# Patient Record
Sex: FEMALE | Race: WHITE | Employment: FULL TIME | URBAN - METROPOLITAN AREA
[De-identification: names, ages, dates, MRNs, and addresses within clinical notes are randomized per-mention and may not be internally consistent; named-entity substitution may affect disease eponyms.]

---

## 2021-03-15 ENCOUNTER — IMMUNIZATION (OUTPATIENT)
Dept: PRIMARY CARE CLINIC | Age: 61
End: 2021-03-15
Payer: COMMERCIAL

## 2021-03-15 PROCEDURE — 91303 COVID-19, J&J VACCINE, PF, 0.5 ML DOSE: CPT | Performed by: INTERNAL MEDICINE

## 2021-03-15 PROCEDURE — 0031A COVID-19, J&J VACCINE, PF, 0.5 ML DOSE: CPT | Performed by: INTERNAL MEDICINE

## 2022-07-21 ENCOUNTER — HOSPITAL ENCOUNTER (OUTPATIENT)
Dept: PREADMISSION TESTING | Age: 62
Discharge: HOME | End: 2022-07-25
Payer: COMMERCIAL

## 2022-07-21 VITALS
BODY MASS INDEX: 25.44 KG/M2 | WEIGHT: 149.03 LBS | HEIGHT: 64 IN | DIASTOLIC BLOOD PRESSURE: 69 MMHG | RESPIRATION RATE: 14 BRPM | OXYGEN SATURATION: 99 % | SYSTOLIC BLOOD PRESSURE: 146 MMHG | TEMPERATURE: 97.7 F | HEART RATE: 75 BPM

## 2022-07-21 LAB
ABO/RH: NORMAL
ANION GAP SERPL CALCULATED.3IONS-SCNC: 8 MMOL/L (ref 9–17)
ANTIBODY SCREEN: NEGATIVE
ARM BAND NUMBER: NORMAL
BUN BLDV-MCNC: 16 MG/DL (ref 8–23)
CHLORIDE BLD-SCNC: 101 MMOL/L (ref 98–107)
CO2: 29 MMOL/L (ref 20–31)
CREAT SERPL-MCNC: 0.7 MG/DL (ref 0.5–0.9)
EXPIRATION DATE: NORMAL
GFR AFRICAN AMERICAN: >60 ML/MIN
GFR NON-AFRICAN AMERICAN: >60 ML/MIN
GFR SERPL CREATININE-BSD FRML MDRD: NORMAL ML/MIN/{1.73_M2}
HCT VFR BLD CALC: 38.1 % (ref 36.3–47.1)
HEMOGLOBIN: 11.8 G/DL (ref 11.9–15.1)
MCH RBC QN AUTO: 27.5 PG (ref 25.2–33.5)
MCHC RBC AUTO-ENTMCNC: 31 G/DL (ref 28.4–34.8)
MCV RBC AUTO: 88.8 FL (ref 82.6–102.9)
NRBC AUTOMATED: 0 PER 100 WBC
PDW BLD-RTO: 13.6 % (ref 11.8–14.4)
PLATELET # BLD: 288 K/UL (ref 138–453)
PMV BLD AUTO: 8.9 FL (ref 8.1–13.5)
POTASSIUM SERPL-SCNC: 4.3 MMOL/L (ref 3.7–5.3)
RBC # BLD: 4.29 M/UL (ref 3.95–5.11)
SODIUM BLD-SCNC: 138 MMOL/L (ref 135–144)
WBC # BLD: 5.1 K/UL (ref 3.5–11.3)

## 2022-07-21 PROCEDURE — 93005 ELECTROCARDIOGRAM TRACING: CPT | Performed by: ANESTHESIOLOGY

## 2022-07-21 PROCEDURE — 85027 COMPLETE CBC AUTOMATED: CPT

## 2022-07-21 PROCEDURE — 82565 ASSAY OF CREATININE: CPT

## 2022-07-21 PROCEDURE — 86850 RBC ANTIBODY SCREEN: CPT

## 2022-07-21 PROCEDURE — 84520 ASSAY OF UREA NITROGEN: CPT

## 2022-07-21 PROCEDURE — 36415 COLL VENOUS BLD VENIPUNCTURE: CPT

## 2022-07-21 PROCEDURE — 87641 MR-STAPH DNA AMP PROBE: CPT

## 2022-07-21 PROCEDURE — 86900 BLOOD TYPING SEROLOGIC ABO: CPT

## 2022-07-21 PROCEDURE — 86901 BLOOD TYPING SEROLOGIC RH(D): CPT

## 2022-07-21 PROCEDURE — 80051 ELECTROLYTE PANEL: CPT

## 2022-07-21 RX ORDER — CETIRIZINE HYDROCHLORIDE 10 MG/1
10 TABLET ORAL DAILY
COMMUNITY

## 2022-07-21 RX ORDER — GABAPENTIN 300 MG/1
300 CAPSULE ORAL ONCE
Status: CANCELLED | OUTPATIENT
Start: 2022-08-04

## 2022-07-21 RX ORDER — MELOXICAM 15 MG/1
15 TABLET ORAL DAILY
COMMUNITY
Start: 2022-06-30

## 2022-07-21 RX ORDER — VALACYCLOVIR HYDROCHLORIDE 1 G/1
1000 TABLET, FILM COATED ORAL PRN
COMMUNITY
Start: 2021-07-27

## 2022-07-21 RX ORDER — ACETAMINOPHEN 500 MG
1000 TABLET ORAL ONCE
Status: CANCELLED | OUTPATIENT
Start: 2022-08-04

## 2022-07-21 ASSESSMENT — HOOS JR
WALKING ON UNEVEN SURFACE: 2
LYING IN BED (TURNING OVER, MAINTAINING HIP POSITION): 2
HOOS JR RAW SCORE: 9
SITTING: 1
HOOS JR TOTAL INTERVAL SCORE: 61.815
BENDING TO THE FLOOR TO PICK UP OBJECT: 1
GOING UP OR DOWN STAIRS: 2
HOOS JR RAW SCORE: 9
RISING FROM SITTING: 1

## 2022-07-21 ASSESSMENT — PROMIS GLOBAL HEALTH SCALE
IN GENERAL, HOW WOULD YOU RATE YOUR PHYSICAL HEALTH [ON A SCALE OF 1 (POOR) TO 5 (EXCELLENT)]?: 3
IN THE PAST 7 DAYS, HOW OFTEN HAVE YOU BEEN BOTHERED BY EMOTIONAL PROBLEMS, SUCH AS FEELING ANXIOUS, DEPRESSED, OR IRRITABLE [ON A SCALE FROM 1 (NEVER) TO 5 (ALWAYS)]?: 3
IN THE PAST 7 DAYS, HOW WOULD YOU RATE YOUR FATIGUE ON AVERAGE [ON A SCALE FROM 1 (NONE) TO 5 (VERY SEVERE)]?: 4
IN GENERAL, HOW WOULD YOU RATE YOUR MENTAL HEALTH, INCLUDING YOUR MOOD AND YOUR ABILITY TO THINK [ON A SCALE OF 1 (POOR) TO 5 (EXCELLENT)]?: 5
TO WHAT EXTENT ARE YOU ABLE TO CARRY OUT YOUR EVERYDAY PHYSICAL ACTIVITIES SUCH AS WALKING, CLIMBING STAIRS, CARRYING GROCERIES, OR MOVING A CHAIR [ON A SCALE OF 1 (NOT AT ALL) TO 5 (COMPLETELY)]?: 3
IN GENERAL, WOULD YOU SAY YOUR HEALTH IS...[ON A SCALE OF 1 (POOR) TO 5 (EXCELLENT)]: 4
IN GENERAL, WOULD YOU SAY YOUR QUALITY OF LIFE IS...[ON A SCALE OF 1 (POOR) TO 5 (EXCELLENT)]: 3
IN THE PAST 7 DAYS, HOW WOULD YOU RATE YOUR PAIN ON AVERAGE [ON A SCALE FROM 0 (NO PAIN) TO 10 (WORST IMAGINABLE PAIN)]?: 5
SUM OF RESPONSES TO QUESTIONS 3, 6, 7, & 8: 15
IN GENERAL, PLEASE RATE HOW WELL YOU CARRY OUT YOUR USUAL SOCIAL ACTIVITIES (INCLUDES ACTIVITIES AT HOME, AT WORK, AND IN YOUR COMMUNITY, AND RESPONSIBILITIES AS A PARENT, CHILD, SPOUSE, EMPLOYEE, FRIEND, ETC) [ON A SCALE OF 1 (POOR) TO 5 (EXCELLENT)]?: 3
SUM OF RESPONSES TO QUESTIONS 2, 4, 5, & 10: 14
IN GENERAL, HOW WOULD YOU RATE YOUR SATISFACTION WITH YOUR SOCIAL ACTIVITIES AND RELATIONSHIPS [ON A SCALE OF 1 (POOR) TO 5 (EXCELLENT)]?: 3

## 2022-07-21 ASSESSMENT — PAIN DESCRIPTION - ORIENTATION: ORIENTATION: LEFT

## 2022-07-21 ASSESSMENT — PAIN DESCRIPTION - DESCRIPTORS: DESCRIPTORS: ACHING

## 2022-07-21 ASSESSMENT — PAIN DESCRIPTION - FREQUENCY: FREQUENCY: INTERMITTENT

## 2022-07-21 ASSESSMENT — PAIN - FUNCTIONAL ASSESSMENT: PAIN_FUNCTIONAL_ASSESSMENT: PREVENTS OR INTERFERES SOME ACTIVE ACTIVITIES AND ADLS

## 2022-07-21 ASSESSMENT — PAIN DESCRIPTION - LOCATION: LOCATION: HIP

## 2022-07-21 ASSESSMENT — PAIN SCALES - GENERAL: PAINLEVEL_OUTOF10: 5

## 2022-07-21 ASSESSMENT — PAIN DESCRIPTION - PAIN TYPE: TYPE: CHRONIC PAIN

## 2022-07-21 ASSESSMENT — PAIN DESCRIPTION - ONSET: ONSET: ON-GOING

## 2022-07-21 NOTE — H&P
History and Physical Service   [facility]    HISTORY AND PHYSICAL EXAMINATION            Date of Evaluation: 7/21/2022  Patient name:  Oscar Ndiaye  MRN:   [id number]  YOB: 1960  PCP:    Ina Sarabia MD    History Obtained From:     Patient, medical records    History of Present Illness: This is Oscar Ndiaye a 63 y.o. female who presents for a pre-admission testing appointment for an upcoming [facility] -  BIOMET by Nba Heck MD scheduled on 8/4/2022 at [phone] due to Osteoarthritis of left hip, unspecified osteoarthritis type [M16.12]. The patient's chief complaint is  left hip pain which has progressively worsened over the past 1 year. Patient still exercises but has increased pain at times with exercise. Left hip pain is aggravated by laying down, walking long distances, getting in and out of the car and is minimally relieved with Tylenol. Prior treatment includes cortisone injections, physical therapy. Denies recent falls and injuries. Sleep apnea questionnaire  1) Do you snore loudly? No  2) Do you often feel tired, fatigued, or sleepy? No  3) Has anyone observed you stop breathing or choking/gasping during your sleep? No  4) Do you have hypertension? No  5) BMI >35 kg/m2? No  6) Age > 50? Yes  7) Pt is a male? No    Functional Capacity:  1) Pt is able to walk 2 city blocks on level ground without SOB. 2) Pt is able to climb 2 flights of stairs without SOB. 3) Pt is able to walk up a hill for 1-2 city blocks without SOB. Past Medical History:     Past Medical History:   Diagnosis Date    Arthritis     osteoarthritis        Past Surgical History:     Past Surgical History:   Procedure Laterality Date    BREAST SURGERY Right     papilloma. benign    COLONOSCOPY      ENDOMETRIAL ABLATION          Medications Prior to Admission:     Prior to Admission medications    Medication Sig Start Date End Date Taking? Authorizing Provider   valACYclovir (VALTREX) 1 g tablet Take 1,000 mg by mouth as needed 7/27/21  Yes Historical Provider, MD   cetirizine (ZYRTEC) 10 MG tablet Take 10 mg by mouth in the morning. Yes Historical Provider, MD   Cholecalciferol (VITAMIN D3 PO) Take 1 tablet by mouth daily   Yes Historical Provider, MD   meloxicam (MOBIC) 15 MG tablet Take 15 mg by mouth in the morning. 6/30/22   Historical Provider, MD        Allergies:     Sulfa antibiotics    Social History:     Tobacco:    reports that she quit smoking about 27 years ago. Her smoking use included cigarettes. She started smoking about 42 years ago. She has never used smokeless tobacco.  Alcohol:      reports current alcohol use. Drug Use:  reports no history of drug use. Family History:     History reviewed. No pertinent family history. Review of Systems:     Positive and Negative as described in HPI. CONSTITUTIONAL: Fatigue. Negative for fevers, chills, sweats, and weight loss. HEENT:  Negative for glasses, hearing changes, rhinorrhea, and throat pain. RESPIRATORY: Negative for shortness of breath, cough, congestion, and wheezing. CARDIOVASCULAR: Negative for chest pain, blood clot, irregular heartbeat, and palpitations. GASTROINTESTINAL:  Negative for reflux, nausea, vomiting, diarrhea, constipation, change in bowel habits, and abdominal pain. GENITOURINARY:  Negative for difficulty of urination, burning with urination, and frequency. INTEGUMENT: Negative for rash, skin lesions, and easy bruising. Instructed pt to call Dr. Rebeca Gage as soon as possible if a rash or wound develops prior to surgery. Pt voiced understanding. HEMATOLOGIC/LYMPHATIC: Negative for swelling/edema. ALLERGIC/IMMUNOLOGIC: Negative for urticaria and itching. ENDOCRINE:  Negative for increase in thirst, increase in urination, and heat or cold intolerance. MUSCULOSKELETAL: See HPI.    NEUROLOGICAL: Carpal tunnel in right wrist. Negative for headaches, dizziness, lightheadedness  BEHAVIOR/PSYCH: Negative for depression and anxiety. Physical Exam:   BP (!) 146/69   Pulse 75   Temp 97.7 °F (36.5 °C) (Oral)   Resp 14   Ht 5' 4\" (1.626 m)   Wt 149 lb 0.5 oz (67.6 kg)   SpO2 99%   BMI 25.58 kg/m²   No LMP recorded. Patient is postmenopausal.  No obstetric history on file. No results for input(s): POCGLU in the last 72 hours. General Appearance:  Alert, well appearing, and in no acute distress. Mental status:  Oriented to person, place, and time. Head:  Normocephalic and atraumatic. Eye:  No icterus, redness, pupils equal and reactive, extraocular eye movements intact, and conjunctiva clear. Ear:  Hearing grossly intact. Nose:  No drainage noted. Mouth:  Mucous membranes moist.  Neck:  Supple and no carotid bruits noted. Lungs:  Bilateral equal air entry, clear to auscultation, no wheezing, rales or rhonchi, and normal effort. Cardiovascular:  Normal rate, regular rhythm, no murmur, gallop, or rub. Abdomen:  Soft, non-tender, non-distended, and active bowel sounds. Neurologic:  Normal speech and cranial nerves II through XII grossly intact. Strength 5/5 bilaterally. Skin:  No gross lesions, rashes, bruising, or bleeding on exposed skin area. Extremities:  Posterior tibial pulses 2+ bilaterally. No pedal edema. No calf tenderness with palpation. Psych:  Normal affect.      Investigations:      Laboratory Testing:  Recent Results (from the past 24 hour(s))   BUN & Creatinine    Collection Time: 07/21/22  8:22 AM   Result Value Ref Range    BUN 16 8 - 23 mg/dL    Creatinine 0.70 0.50 - 0.90 mg/dL    GFR Non-African American >60 >60 mL/min    GFR African American >60 >60 mL/min    GFR Comment         Electrolyte Panel    Collection Time: 07/21/22  8:22 AM   Result Value Ref Range    Sodium 138 135 - 144 mmol/L    Potassium 4.3 3.7 - 5.3 mmol/L    Chloride 101 98 - 107 mmol/L    CO2 29 20 - 31 mmol/L    Anion Gap 8 (L) 9 - 17 mmol/L   CBC Collection Time: 22  8:22 AM   Result Value Ref Range    WBC 5.1 3.5 - 11.3 k/uL    RBC 4.29 3.95 - 5.11 m/uL    Hemoglobin 11.8 (L) 11.9 - 15.1 g/dL    Hematocrit 38.1 36.3 - 47.1 %    MCV 88.8 82.6 - 102.9 fL    MCH 27.5 25.2 - 33.5 pg    MCHC 31.0 28.4 - 34.8 g/dL    RDW 13.6 11.8 - 14.4 %    Platelets 915 772 - 031 k/uL    MPV 8.9 8.1 - 13.5 fL    NRBC Automated 0.0 0.0 per 100 WBC   EKG 12 Lead    Collection Time: 22  9:05 AM   Result Value Ref Range    Ventricular Rate 59 BPM    Atrial Rate 59 BPM    P-R Interval 162 ms    QRS Duration 76 ms    Q-T Interval 428 ms    QTc Calculation (Bazett) 423 ms    P Axis 66 degrees    R Axis 80 degrees    T Axis 45 degrees       Recent Labs     22  0822   HGB 11.8*   HCT 38.1   WBC 5.1   MCV 88.8      K 4.3      CO2 29   BUN 16   CREATININE 0.70       No results for input(s): COVID19 in the last 720 hours. *Please note that labs listed above are the most recent lab values available in EPIC at the time of the visit and additional labs may have been drawn or resulted since that time. Imaging/Diagnostics:    No results found. EK2022: See Epic. Diagnosis:      1. Osteoarthritis of left hip, unspecified osteoarthritis type [M16.12]. Plans:     1.  LEFT HIP TOTAL ARTHROPLASTY ANTERIOR APPROACH -  BIOMET      Katy Parker, APRN - CNP  2022  9:08 AM beer

## 2022-07-21 NOTE — H&P (VIEW-ONLY)
History and Physical Service   [address]    HISTORY AND PHYSICAL EXAMINATION            Date of Evaluation: 7/21/2022  Patient name:  Ana Maria De Dios  MRN:   [id number]  YOB: 1960  PCP:    Belia Torres MD    History Obtained From:     Patient, medical records    History of Present Illness: This is Ana Maria De Dios a 63 y.o. female who presents for a pre-admission testing appointment for an upcoming [facility] 70 -  BIOMET by Abril Martinez MD scheduled on 8/4/2022 at [address] [facility] due to Osteoarthritis of left hip, unspecified osteoarthritis type [M16.12]. The patient's chief complaint is  left hip pain which has progressively worsened over the past 1 year. Patient still exercises but has increased pain at times with exercise. Left hip pain is aggravated by laying down, walking long distances, getting in and out of the car and is minimally relieved with Tylenol. Prior treatment includes cortisone injections, physical therapy. Denies recent falls and injuries. Sleep apnea questionnaire  1) Do you snore loudly? No  2) Do you often feel tired, fatigued, or sleepy? No  3) Has anyone observed you stop breathing or choking/gasping during your sleep? No  4) Do you have hypertension? No  5) BMI >35 kg/m2? No  6) Age > 50? Yes  7) Pt is a male? No    Functional Capacity:  1) Pt is able to walk 2 city blocks on level ground without SOB. 2) Pt is able to climb 2 flights of stairs without SOB. 3) Pt is able to walk up a hill for 1-2 city blocks without SOB. Past Medical History:     Past Medical History:   Diagnosis Date    Arthritis     osteoarthritis        Past Surgical History:     Past Surgical History:   Procedure Laterality Date    BREAST SURGERY Right     papilloma. benign    COLONOSCOPY      ENDOMETRIAL ABLATION          Medications Prior to Admission:     Prior to Admission medications    Medication Sig Start Date End Date Taking? Authorizing Provider   valACYclovir (VALTREX) 1 g tablet Take 1,000 mg by mouth as needed 7/27/21  Yes Historical Provider, MD   cetirizine (ZYRTEC) 10 MG tablet Take 10 mg by mouth in the morning. Yes Historical Provider, MD   Cholecalciferol (VITAMIN D3 PO) Take 1 tablet by mouth daily   Yes Historical Provider, MD   meloxicam (MOBIC) 15 MG tablet Take 15 mg by mouth in the morning. 6/30/22   Historical Provider, MD        Allergies:     Sulfa antibiotics    Social History:     Tobacco:    reports that she quit smoking about 27 years ago. Her smoking use included cigarettes. She started smoking about 42 years ago. She has never used smokeless tobacco.  Alcohol:      reports current alcohol use. Drug Use:  reports no history of drug use. Family History:     History reviewed. No pertinent family history. Review of Systems:     Positive and Negative as described in HPI. CONSTITUTIONAL: Fatigue. Negative for fevers, chills, sweats, and weight loss. HEENT:  Negative for glasses, hearing changes, rhinorrhea, and throat pain. RESPIRATORY: Negative for shortness of breath, cough, congestion, and wheezing. CARDIOVASCULAR: Negative for chest pain, blood clot, irregular heartbeat, and palpitations. GASTROINTESTINAL:  Negative for reflux, nausea, vomiting, diarrhea, constipation, change in bowel habits, and abdominal pain. GENITOURINARY:  Negative for difficulty of urination, burning with urination, and frequency. INTEGUMENT: Negative for rash, skin lesions, and easy bruising. Instructed pt to call Dr. Gaurav Arita as soon as possible if a rash or wound develops prior to surgery. Pt voiced understanding. HEMATOLOGIC/LYMPHATIC: Negative for swelling/edema. ALLERGIC/IMMUNOLOGIC: Negative for urticaria and itching. ENDOCRINE:  Negative for increase in thirst, increase in urination, and heat or cold intolerance. MUSCULOSKELETAL: See HPI.    NEUROLOGICAL: Carpal tunnel in right wrist. Negative for headaches, dizziness, lightheadedness  BEHAVIOR/PSYCH: Negative for depression and anxiety. Physical Exam:   BP (!) 146/69   Pulse 75   Temp 97.7 °F (36.5 °C) (Oral)   Resp 14   Ht 5' 4\" (1.626 m)   Wt 149 lb 0.5 oz (67.6 kg)   SpO2 99%   BMI 25.58 kg/m²   No LMP recorded. Patient is postmenopausal.  No obstetric history on file. No results for input(s): POCGLU in the last 72 hours. General Appearance:  Alert, well appearing, and in no acute distress. Mental status:  Oriented to person, place, and time. Head:  Normocephalic and atraumatic. Eye:  No icterus, redness, pupils equal and reactive, extraocular eye movements intact, and conjunctiva clear. Ear:  Hearing grossly intact. Nose:  No drainage noted. Mouth:  Mucous membranes moist.  Neck:  Supple and no carotid bruits noted. Lungs:  Bilateral equal air entry, clear to auscultation, no wheezing, rales or rhonchi, and normal effort. Cardiovascular:  Normal rate, regular rhythm, no murmur, gallop, or rub. Abdomen:  Soft, non-tender, non-distended, and active bowel sounds. Neurologic:  Normal speech and cranial nerves II through XII grossly intact. Strength 5/5 bilaterally. Skin:  No gross lesions, rashes, bruising, or bleeding on exposed skin area. Extremities:  Posterior tibial pulses 2+ bilaterally. No pedal edema. No calf tenderness with palpation. Psych:  Normal affect.      Investigations:      Laboratory Testing:  Recent Results (from the past 24 hour(s))   BUN & Creatinine    Collection Time: 07/21/22  8:22 AM   Result Value Ref Range    BUN 16 8 - 23 mg/dL    Creatinine 0.70 0.50 - 0.90 mg/dL    GFR Non-African American >60 >60 mL/min    GFR African American >60 >60 mL/min    GFR Comment         Electrolyte Panel    Collection Time: 07/21/22  8:22 AM   Result Value Ref Range    Sodium 138 135 - 144 mmol/L    Potassium 4.3 3.7 - 5.3 mmol/L    Chloride 101 98 - 107 mmol/L    CO2 29 20 - 31 mmol/L    Anion Gap 8 (L) 9 - 17 mmol/L   CBC

## 2022-07-21 NOTE — PRE-PROCEDURE INSTRUCTIONS
clothes available to put on after the shower. First wash your hair with regular shampoo. Rinse your hair and body thoroughly to remove the shampoo. Wash your face and genital area (private parts) with your regular soap or water only. Thoroughly rinse your body with warm water from the neck down. Turn water off to prevent rinsing the soap off too soon. With a clean wet washcloth and half of the CHG soap in the bottle, lather your entire body from the neck down. Do not use CHG soap near your eyes or ears to avoid injury to those areas. Wash thoroughly, paying special attention to the area where your surgery will be performed. Wash your body gently for five (5) minutes. Avoid scrubbing your skin too hard. Turn the water back on and rinse your body thoroughly. Pat yourself dry with a clean, soft towel. Do not apply lotion, cream or powder. Dress with clean freshly washed clothes. The morning of surgery:    Repeat shower following steps above - using remaining half of CHG soap in bottle. Patient Instructions: If you are having any type of anesthesia you are to have nothing to eat or drink after midnight the night before your surgery. This includes gum, hard candy, mints, water or smoking or chewing tobacco.  The only exception to this is a small sip of water to take with any morning dose of heart, blood pressure, or seizure medications. No alcoholic beverages for 24 hours prior to surgery. Brush your teeth but do not swallow water. Bring your eyeglasses and case with you. No contacts are to be worn the day of surgery. You also may bring your hearing aids. Most surgical procedures involving anesthesia will require that you remove your dentures prior to surgery. ou.    Do not wear any jewelry or body piercings day of surgery. Also, NO lotion, perfume or deodorant to be used the day of surgery.   No nail polish on the operative extremity (arm/leg surgeries)    If you are staying overnight with us, please bring a small bag of necessary personal items. Please wear loose, comfortable clothing. If you are potentially going to have a cast or brace bring clothing that will fit over them. In case of illness - If you have cold or flu like symptoms (high fever, runny nose, sore throat, cough, etc.) rash, nausea, vomiting, loose stools, and/or recent contact with someone who has a contagious disease (chicken pox, measles, etc.) Please call your doctor before coming to the hospital.         Day of Surgery/Procedure:    As a patient at [facility] - INPATIENT you can expect quality medical and nursing care that is centered on your individual needs. Our goal is to make your surgical experience as comfortable as possible    . Transportation After Your Surgery/Procedure: You will need a friend or family member to drive you home after your procedure. Your  must be 25years of age or older and able to sign off on your discharge instructions. A taxi cab or any other form of public transportation is not acceptable. Your friend or family member must stay at the hospital throughout your procedure. Someone must remain with you for the first 24 hours after your surgery if you receive anesthesia or medication. If you do not have someone to stay with you, your procedure may be cancelled.       If you have any other questions regarding your procedure or the day of surgery, please call [phone]      _________________________  ____________________________  Signature (Patient)              Signature (Provider) & date

## 2022-07-22 LAB
EKG ATRIAL RATE: 59 BPM
EKG P AXIS: 66 DEGREES
EKG P-R INTERVAL: 162 MS
EKG Q-T INTERVAL: 428 MS
EKG QRS DURATION: 76 MS
EKG QTC CALCULATION (BAZETT): 423 MS
EKG R AXIS: 80 DEGREES
EKG T AXIS: 45 DEGREES
EKG VENTRICULAR RATE: 59 BPM
MRSA, DNA, NASAL: NEGATIVE
SPECIMEN DESCRIPTION: NORMAL

## 2022-07-22 PROCEDURE — 93010 ELECTROCARDIOGRAM REPORT: CPT | Performed by: INTERNAL MEDICINE

## 2022-07-25 NOTE — PROGRESS NOTES
[facility] Joint Replacement Pre-surgical Assessment    Scheduled Surgery Date: 08/04/2022  Surgery Time: 1045    Surgeon: ARACELI MARQUES  Procedure: left Total Hip    Primary Insurance Coverage [facility] CHOICE PLU  Pre-op class attended YES    PCP: Umesh Knox MD  Clearance received by PCP: Yes    Anticipated Discharge Plan: home  Agency (if applicable): UNSURE    Significant PMH:   Surgical History    Procedure Laterality Date Comment Source   BREAST SURGERY Right  papilloma. benign    COLONOSCOPY       ENDOMETRIAL ABLATION           ED Notes    ED Notes      Medical History    Diagnosis Date Comment Source   Arthritis  osteoarthritis           Smoking history: the patient is a former smoker who quit smoking In 1995    Alcohol history: Current alcohol use: SOCIAL    Concerns prior to surgery: PT MET WITH P.T. AFTER CLASS.      Electronically signed by: Jose R Gama RN on 7/25/2022 at 12:19 PM  08/04/2022

## 2022-08-03 ENCOUNTER — ANESTHESIA EVENT (OUTPATIENT)
Dept: OPERATING ROOM | Age: 62
End: 2022-08-03
Payer: COMMERCIAL

## 2022-08-04 ENCOUNTER — APPOINTMENT (OUTPATIENT)
Dept: GENERAL RADIOLOGY | Age: 62
End: 2022-08-04
Attending: ORTHOPAEDIC SURGERY
Payer: COMMERCIAL

## 2022-08-04 ENCOUNTER — HOSPITAL ENCOUNTER (OUTPATIENT)
Age: 62
Discharge: HOME | End: 2022-08-06
Attending: ORTHOPAEDIC SURGERY | Admitting: ORTHOPAEDIC SURGERY
Payer: COMMERCIAL

## 2022-08-04 ENCOUNTER — ANESTHESIA (OUTPATIENT)
Dept: OPERATING ROOM | Age: 62
End: 2022-08-04
Payer: COMMERCIAL

## 2022-08-04 DIAGNOSIS — G89.18 ACUTE POSTOPERATIVE PAIN: Primary | ICD-10-CM

## 2022-08-04 PROCEDURE — C1776 JOINT DEVICE (IMPLANTABLE): HCPCS | Performed by: ORTHOPAEDIC SURGERY

## 2022-08-04 PROCEDURE — 97166 OT EVAL MOD COMPLEX 45 MIN: CPT

## 2022-08-04 PROCEDURE — 7100000000 HC PACU RECOVERY - FIRST 15 MIN: Performed by: ORTHOPAEDIC SURGERY

## 2022-08-04 PROCEDURE — 97535 SELF CARE MNGMENT TRAINING: CPT

## 2022-08-04 PROCEDURE — 73502 X-RAY EXAM HIP UNI 2-3 VIEWS: CPT

## 2022-08-04 PROCEDURE — 2580000003 HC RX 258: Performed by: ANESTHESIOLOGY

## 2022-08-04 PROCEDURE — 6360000002 HC RX W HCPCS: Performed by: ANESTHESIOLOGY

## 2022-08-04 PROCEDURE — 97162 PT EVAL MOD COMPLEX 30 MIN: CPT

## 2022-08-04 PROCEDURE — 7100000001 HC PACU RECOVERY - ADDTL 15 MIN: Performed by: ORTHOPAEDIC SURGERY

## 2022-08-04 PROCEDURE — A4217 STERILE WATER/SALINE, 500 ML: HCPCS | Performed by: ORTHOPAEDIC SURGERY

## 2022-08-04 PROCEDURE — 6370000000 HC RX 637 (ALT 250 FOR IP): Performed by: ANESTHESIOLOGY

## 2022-08-04 PROCEDURE — 3700000001 HC ADD 15 MINUTES (ANESTHESIA): Performed by: ORTHOPAEDIC SURGERY

## 2022-08-04 PROCEDURE — 3209999900 FLUORO FOR SURGICAL PROCEDURES

## 2022-08-04 PROCEDURE — 6370000000 HC RX 637 (ALT 250 FOR IP): Performed by: ORTHOPAEDIC SURGERY

## 2022-08-04 PROCEDURE — 6360000002 HC RX W HCPCS: Performed by: ORTHOPAEDIC SURGERY

## 2022-08-04 PROCEDURE — 2500000003 HC RX 250 WO HCPCS: Performed by: ANESTHESIOLOGY

## 2022-08-04 PROCEDURE — 2580000003 HC RX 258: Performed by: STUDENT IN AN ORGANIZED HEALTH CARE EDUCATION/TRAINING PROGRAM

## 2022-08-04 PROCEDURE — 2709999900 HC NON-CHARGEABLE SUPPLY: Performed by: ORTHOPAEDIC SURGERY

## 2022-08-04 PROCEDURE — 3600000005 HC SURGERY LEVEL 5 BASE: Performed by: ORTHOPAEDIC SURGERY

## 2022-08-04 PROCEDURE — 97530 THERAPEUTIC ACTIVITIES: CPT

## 2022-08-04 PROCEDURE — 2580000003 HC RX 258: Performed by: ORTHOPAEDIC SURGERY

## 2022-08-04 PROCEDURE — 3600000015 HC SURGERY LEVEL 5 ADDTL 15MIN: Performed by: ORTHOPAEDIC SURGERY

## 2022-08-04 PROCEDURE — 6360000002 HC RX W HCPCS

## 2022-08-04 PROCEDURE — 3700000000 HC ANESTHESIA ATTENDED CARE: Performed by: ORTHOPAEDIC SURGERY

## 2022-08-04 PROCEDURE — 72170 X-RAY EXAM OF PELVIS: CPT

## 2022-08-04 DEVICE — IMPLANTABLE DEVICE
Type: IMPLANTABLE DEVICE | Site: HIP | Status: FUNCTIONAL
Brand: BIOLOX OPTION HIP SYSTEM

## 2022-08-04 DEVICE — LINER ACET 36 MM HIP NEUT POLYETH G7 VIVACIT E: Type: IMPLANTABLE DEVICE | Site: HIP | Status: FUNCTIONAL

## 2022-08-04 DEVICE — HEAD FEM DIA36MM HIP BIOLOX DELT OPT FOR G7 ACET SYS: Type: IMPLANTABLE DEVICE | Site: HIP | Status: FUNCTIONAL

## 2022-08-04 DEVICE — STEM FEM SZ 11 L107.5MM 133DEG HIP PPS HI OFFSET TYP 1 TAPR: Type: IMPLANTABLE DEVICE | Site: HIP | Status: FUNCTIONAL

## 2022-08-04 DEVICE — BONE SCREW 6.5X30 SELF-TAP: Type: IMPLANTABLE DEVICE | Site: HIP | Status: FUNCTIONAL

## 2022-08-04 DEVICE — G7 FINNED 3 HOLE SHELL 50D: Type: IMPLANTABLE DEVICE | Site: HIP | Status: FUNCTIONAL

## 2022-08-04 DEVICE — UPCHARGE KNEE VITAMIN E LINER ZIMMER BIOMET: Type: IMPLANTABLE DEVICE | Site: HIP | Status: FUNCTIONAL

## 2022-08-04 RX ORDER — SODIUM CHLORIDE 0.9 % (FLUSH) 0.9 %
5-40 SYRINGE (ML) INJECTION PRN
Status: DISCONTINUED | OUTPATIENT
Start: 2022-08-04 | End: 2022-08-04 | Stop reason: HOSPADM

## 2022-08-04 RX ORDER — FENTANYL CITRATE 50 UG/ML
25 INJECTION, SOLUTION INTRAMUSCULAR; INTRAVENOUS EVERY 5 MIN PRN
Status: DISCONTINUED | OUTPATIENT
Start: 2022-08-04 | End: 2022-08-04 | Stop reason: HOSPADM

## 2022-08-04 RX ORDER — HYDROXYZINE HYDROCHLORIDE 10 MG/1
10 TABLET, FILM COATED ORAL EVERY 8 HOURS PRN
Status: DISCONTINUED | OUTPATIENT
Start: 2022-08-04 | End: 2022-08-06 | Stop reason: HOSPADM

## 2022-08-04 RX ORDER — ONDANSETRON 2 MG/ML
INJECTION INTRAMUSCULAR; INTRAVENOUS PRN
Status: DISCONTINUED | OUTPATIENT
Start: 2022-08-04 | End: 2022-08-04 | Stop reason: SDUPTHER

## 2022-08-04 RX ORDER — SENNA PLUS 8.6 MG/1
1 TABLET ORAL DAILY PRN
Status: DISCONTINUED | OUTPATIENT
Start: 2022-08-04 | End: 2022-08-06 | Stop reason: HOSPADM

## 2022-08-04 RX ORDER — SODIUM CHLORIDE 9 MG/ML
INJECTION, SOLUTION INTRAVENOUS CONTINUOUS
Status: DISCONTINUED | OUTPATIENT
Start: 2022-08-04 | End: 2022-08-06 | Stop reason: HOSPADM

## 2022-08-04 RX ORDER — PROPOFOL 10 MG/ML
INJECTION, EMULSION INTRAVENOUS PRN
Status: DISCONTINUED | OUTPATIENT
Start: 2022-08-04 | End: 2022-08-04 | Stop reason: SDUPTHER

## 2022-08-04 RX ORDER — KETAMINE HCL IN NACL, ISO-OSM 100MG/10ML
SYRINGE (ML) INJECTION PRN
Status: DISCONTINUED | OUTPATIENT
Start: 2022-08-04 | End: 2022-08-04 | Stop reason: SDUPTHER

## 2022-08-04 RX ORDER — SODIUM CHLORIDE 0.9 % (FLUSH) 0.9 %
5-40 SYRINGE (ML) INJECTION EVERY 12 HOURS SCHEDULED
Status: DISCONTINUED | OUTPATIENT
Start: 2022-08-04 | End: 2022-08-06 | Stop reason: HOSPADM

## 2022-08-04 RX ORDER — VANCOMYCIN HYDROCHLORIDE 1 G/20ML
INJECTION, POWDER, LYOPHILIZED, FOR SOLUTION INTRAVENOUS PRN
Status: DISCONTINUED | OUTPATIENT
Start: 2022-08-04 | End: 2022-08-04 | Stop reason: ALTCHOICE

## 2022-08-04 RX ORDER — VANCOMYCIN HYDROCHLORIDE 1 G/20ML
INJECTION, POWDER, LYOPHILIZED, FOR SOLUTION INTRAVENOUS
Status: DISPENSED
Start: 2022-08-04 | End: 2022-08-04

## 2022-08-04 RX ORDER — LIDOCAINE HYDROCHLORIDE 20 MG/ML
INJECTION, SOLUTION EPIDURAL; INFILTRATION; INTRACAUDAL; PERINEURAL PRN
Status: DISCONTINUED | OUTPATIENT
Start: 2022-08-04 | End: 2022-08-04 | Stop reason: SDUPTHER

## 2022-08-04 RX ORDER — OXYCODONE HYDROCHLORIDE 5 MG/1
5 TABLET ORAL EVERY 4 HOURS PRN
Status: DISCONTINUED | OUTPATIENT
Start: 2022-08-04 | End: 2022-08-06 | Stop reason: HOSPADM

## 2022-08-04 RX ORDER — ASPIRIN 325 MG
325 TABLET, DELAYED RELEASE (ENTERIC COATED) ORAL 2 TIMES DAILY
Qty: 56 TABLET | Refills: 0 | Status: SHIPPED | OUTPATIENT
Start: 2022-08-04 | End: 2022-09-01

## 2022-08-04 RX ORDER — SODIUM CHLORIDE 0.9 % (FLUSH) 0.9 %
5-40 SYRINGE (ML) INJECTION PRN
Status: DISCONTINUED | OUTPATIENT
Start: 2022-08-04 | End: 2022-08-06 | Stop reason: HOSPADM

## 2022-08-04 RX ORDER — ACETAMINOPHEN 325 MG/1
650 TABLET ORAL EVERY 6 HOURS
Status: DISCONTINUED | OUTPATIENT
Start: 2022-08-04 | End: 2022-08-06 | Stop reason: HOSPADM

## 2022-08-04 RX ORDER — DEXAMETHASONE SODIUM PHOSPHATE 10 MG/ML
INJECTION, SOLUTION INTRAMUSCULAR; INTRAVENOUS PRN
Status: DISCONTINUED | OUTPATIENT
Start: 2022-08-04 | End: 2022-08-04 | Stop reason: SDUPTHER

## 2022-08-04 RX ORDER — MAGNESIUM HYDROXIDE 1200 MG/15ML
LIQUID ORAL CONTINUOUS PRN
Status: COMPLETED | OUTPATIENT
Start: 2022-08-04 | End: 2022-08-04

## 2022-08-04 RX ORDER — TRANEXAMIC ACID 100 MG/ML
INJECTION, SOLUTION INTRAVENOUS
Status: COMPLETED
Start: 2022-08-04 | End: 2022-08-04

## 2022-08-04 RX ORDER — SODIUM CHLORIDE, SODIUM LACTATE, POTASSIUM CHLORIDE, CALCIUM CHLORIDE 600; 310; 30; 20 MG/100ML; MG/100ML; MG/100ML; MG/100ML
INJECTION, SOLUTION INTRAVENOUS CONTINUOUS
Status: DISCONTINUED | OUTPATIENT
Start: 2022-08-04 | End: 2022-08-04

## 2022-08-04 RX ORDER — HYDROMORPHONE HYDROCHLORIDE 1 MG/ML
0.25 INJECTION, SOLUTION INTRAMUSCULAR; INTRAVENOUS; SUBCUTANEOUS EVERY 5 MIN PRN
Status: COMPLETED | OUTPATIENT
Start: 2022-08-04 | End: 2022-08-04

## 2022-08-04 RX ORDER — SODIUM CHLORIDE 9 MG/ML
INJECTION, SOLUTION INTRAVENOUS PRN
Status: DISCONTINUED | OUTPATIENT
Start: 2022-08-04 | End: 2022-08-06 | Stop reason: HOSPADM

## 2022-08-04 RX ORDER — MIDAZOLAM HYDROCHLORIDE 1 MG/ML
INJECTION INTRAMUSCULAR; INTRAVENOUS PRN
Status: DISCONTINUED | OUTPATIENT
Start: 2022-08-04 | End: 2022-08-04 | Stop reason: SDUPTHER

## 2022-08-04 RX ORDER — OXYCODONE HYDROCHLORIDE 5 MG/1
10 TABLET ORAL EVERY 4 HOURS PRN
Status: DISCONTINUED | OUTPATIENT
Start: 2022-08-04 | End: 2022-08-06 | Stop reason: HOSPADM

## 2022-08-04 RX ORDER — SODIUM CHLORIDE 0.9 % (FLUSH) 0.9 %
5-40 SYRINGE (ML) INJECTION EVERY 12 HOURS SCHEDULED
Status: DISCONTINUED | OUTPATIENT
Start: 2022-08-04 | End: 2022-08-04 | Stop reason: HOSPADM

## 2022-08-04 RX ORDER — SODIUM CHLORIDE, SODIUM LACTATE, POTASSIUM CHLORIDE, AND CALCIUM CHLORIDE .6; .31; .03; .02 G/100ML; G/100ML; G/100ML; G/100ML
500 INJECTION, SOLUTION INTRAVENOUS ONCE
Status: COMPLETED | OUTPATIENT
Start: 2022-08-04 | End: 2022-08-04

## 2022-08-04 RX ORDER — ONDANSETRON 2 MG/ML
4 INJECTION INTRAMUSCULAR; INTRAVENOUS EVERY 6 HOURS PRN
Status: DISCONTINUED | OUTPATIENT
Start: 2022-08-04 | End: 2022-08-06 | Stop reason: HOSPADM

## 2022-08-04 RX ORDER — FENTANYL CITRATE 50 UG/ML
INJECTION, SOLUTION INTRAMUSCULAR; INTRAVENOUS PRN
Status: DISCONTINUED | OUTPATIENT
Start: 2022-08-04 | End: 2022-08-04 | Stop reason: SDUPTHER

## 2022-08-04 RX ORDER — KETOROLAC TROMETHAMINE 30 MG/ML
30 INJECTION, SOLUTION INTRAMUSCULAR; INTRAVENOUS EVERY 6 HOURS
Status: DISCONTINUED | OUTPATIENT
Start: 2022-08-04 | End: 2022-08-06 | Stop reason: HOSPADM

## 2022-08-04 RX ORDER — GLYCOPYRROLATE 0.2 MG/ML
INJECTION INTRAMUSCULAR; INTRAVENOUS PRN
Status: DISCONTINUED | OUTPATIENT
Start: 2022-08-04 | End: 2022-08-04 | Stop reason: SDUPTHER

## 2022-08-04 RX ORDER — SODIUM CHLORIDE 9 MG/ML
INJECTION, SOLUTION INTRAVENOUS CONTINUOUS
Status: DISCONTINUED | OUTPATIENT
Start: 2022-08-04 | End: 2022-08-04

## 2022-08-04 RX ORDER — ONDANSETRON 2 MG/ML
INJECTION INTRAMUSCULAR; INTRAVENOUS
Status: COMPLETED
Start: 2022-08-04 | End: 2022-08-04

## 2022-08-04 RX ORDER — ONDANSETRON 4 MG/1
4 TABLET, FILM COATED ORAL EVERY 6 HOURS PRN
Qty: 30 TABLET | Refills: 1 | Status: SHIPPED | OUTPATIENT
Start: 2022-08-04

## 2022-08-04 RX ORDER — LIDOCAINE HYDROCHLORIDE 10 MG/ML
1 INJECTION, SOLUTION EPIDURAL; INFILTRATION; INTRACAUDAL; PERINEURAL
Status: DISCONTINUED | OUTPATIENT
Start: 2022-08-04 | End: 2022-08-04 | Stop reason: HOSPADM

## 2022-08-04 RX ORDER — GABAPENTIN 300 MG/1
300 CAPSULE ORAL ONCE
Status: COMPLETED | OUTPATIENT
Start: 2022-08-04 | End: 2022-08-04

## 2022-08-04 RX ORDER — DOCUSATE SODIUM 100 MG/1
100 CAPSULE, LIQUID FILLED ORAL 2 TIMES DAILY
Qty: 30 CAPSULE | Refills: 0 | Status: SHIPPED | OUTPATIENT
Start: 2022-08-04

## 2022-08-04 RX ORDER — OXYCODONE HYDROCHLORIDE AND ACETAMINOPHEN 5; 325 MG/1; MG/1
1-2 TABLET ORAL EVERY 4 HOURS PRN
Qty: 50 TABLET | Refills: 0 | Status: SHIPPED | OUTPATIENT
Start: 2022-08-04 | End: 2022-08-11

## 2022-08-04 RX ORDER — SODIUM CHLORIDE 9 MG/ML
INJECTION, SOLUTION INTRAVENOUS PRN
Status: DISCONTINUED | OUTPATIENT
Start: 2022-08-04 | End: 2022-08-04 | Stop reason: HOSPADM

## 2022-08-04 RX ORDER — ACETAMINOPHEN 500 MG
1000 TABLET ORAL ONCE
Status: COMPLETED | OUTPATIENT
Start: 2022-08-04 | End: 2022-08-04

## 2022-08-04 RX ORDER — ONDANSETRON 2 MG/ML
4 INJECTION INTRAMUSCULAR; INTRAVENOUS
Status: DISCONTINUED | OUTPATIENT
Start: 2022-08-04 | End: 2022-08-04 | Stop reason: HOSPADM

## 2022-08-04 RX ORDER — TRANEXAMIC ACID 100 MG/ML
INJECTION, SOLUTION INTRAVENOUS PRN
Status: DISCONTINUED | OUTPATIENT
Start: 2022-08-04 | End: 2022-08-04 | Stop reason: SDUPTHER

## 2022-08-04 RX ORDER — ONDANSETRON 4 MG/1
4 TABLET, ORALLY DISINTEGRATING ORAL EVERY 8 HOURS PRN
Status: DISCONTINUED | OUTPATIENT
Start: 2022-08-04 | End: 2022-08-06 | Stop reason: HOSPADM

## 2022-08-04 RX ADMIN — FENTANYL CITRATE 50 MCG: 50 INJECTION, SOLUTION INTRAMUSCULAR; INTRAVENOUS at 12:15

## 2022-08-04 RX ADMIN — GABAPENTIN 300 MG: 300 CAPSULE ORAL at 09:13

## 2022-08-04 RX ADMIN — LIDOCAINE HYDROCHLORIDE 70 MG: 20 INJECTION, SOLUTION EPIDURAL; INFILTRATION; INTRACAUDAL; PERINEURAL at 10:38

## 2022-08-04 RX ADMIN — GLYCOPYRROLATE 0.3 MG: 0.2 INJECTION, SOLUTION INTRAMUSCULAR; INTRAVENOUS at 11:18

## 2022-08-04 RX ADMIN — SODIUM CHLORIDE, POTASSIUM CHLORIDE, SODIUM LACTATE AND CALCIUM CHLORIDE: 600; 310; 30; 20 INJECTION, SOLUTION INTRAVENOUS at 12:07

## 2022-08-04 RX ADMIN — HYDROMORPHONE HYDROCHLORIDE 0.25 MG: 1 INJECTION, SOLUTION INTRAMUSCULAR; INTRAVENOUS; SUBCUTANEOUS at 15:06

## 2022-08-04 RX ADMIN — HYDROMORPHONE HYDROCHLORIDE 0.25 MG: 1 INJECTION, SOLUTION INTRAMUSCULAR; INTRAVENOUS; SUBCUTANEOUS at 14:51

## 2022-08-04 RX ADMIN — FENTANYL CITRATE 50 MCG: 50 INJECTION, SOLUTION INTRAMUSCULAR; INTRAVENOUS at 12:39

## 2022-08-04 RX ADMIN — HYDROMORPHONE HYDROCHLORIDE 0.25 MG: 1 INJECTION, SOLUTION INTRAMUSCULAR; INTRAVENOUS; SUBCUTANEOUS at 14:46

## 2022-08-04 RX ADMIN — PROPOFOL 200 MG: 10 INJECTION, EMULSION INTRAVENOUS at 10:38

## 2022-08-04 RX ADMIN — CEFAZOLIN 2000 MG: 10 INJECTION, POWDER, FOR SOLUTION INTRAVENOUS at 10:50

## 2022-08-04 RX ADMIN — ASPIRIN 325 MG: 325 TABLET, DELAYED RELEASE ORAL at 20:58

## 2022-08-04 RX ADMIN — HYDROMORPHONE HYDROCHLORIDE 0.25 MG: 1 INJECTION, SOLUTION INTRAMUSCULAR; INTRAVENOUS; SUBCUTANEOUS at 15:00

## 2022-08-04 RX ADMIN — FENTANYL CITRATE 50 MCG: 50 INJECTION, SOLUTION INTRAMUSCULAR; INTRAVENOUS at 11:54

## 2022-08-04 RX ADMIN — ACETAMINOPHEN 1000 MG: 500 TABLET ORAL at 09:13

## 2022-08-04 RX ADMIN — SODIUM CHLORIDE, POTASSIUM CHLORIDE, SODIUM LACTATE AND CALCIUM CHLORIDE: 600; 310; 30; 20 INJECTION, SOLUTION INTRAVENOUS at 08:52

## 2022-08-04 RX ADMIN — SODIUM CHLORIDE: 9 INJECTION, SOLUTION INTRAVENOUS at 20:47

## 2022-08-04 RX ADMIN — SODIUM CHLORIDE, POTASSIUM CHLORIDE, SODIUM LACTATE AND CALCIUM CHLORIDE 500 ML: 600; 310; 30; 20 INJECTION, SOLUTION INTRAVENOUS at 18:13

## 2022-08-04 RX ADMIN — BISACODYL 5 MG: 5 TABLET, COATED ORAL at 17:05

## 2022-08-04 RX ADMIN — Medication 25 MG: at 10:38

## 2022-08-04 RX ADMIN — ACETAMINOPHEN 650 MG: 325 TABLET, FILM COATED ORAL at 23:46

## 2022-08-04 RX ADMIN — FENTANYL CITRATE 50 MCG: 50 INJECTION, SOLUTION INTRAMUSCULAR; INTRAVENOUS at 13:54

## 2022-08-04 RX ADMIN — KETOROLAC TROMETHAMINE 30 MG: 30 INJECTION, SOLUTION INTRAMUSCULAR at 23:46

## 2022-08-04 RX ADMIN — ONDANSETRON 4 MG: 2 INJECTION INTRAMUSCULAR; INTRAVENOUS at 13:55

## 2022-08-04 RX ADMIN — OXYCODONE 10 MG: 5 TABLET ORAL at 17:05

## 2022-08-04 RX ADMIN — KETOROLAC TROMETHAMINE 30 MG: 30 INJECTION, SOLUTION INTRAMUSCULAR at 17:04

## 2022-08-04 RX ADMIN — CEFAZOLIN 2000 MG: 10 INJECTION, POWDER, FOR SOLUTION INTRAVENOUS at 17:36

## 2022-08-04 RX ADMIN — FENTANYL CITRATE 100 MCG: 50 INJECTION, SOLUTION INTRAMUSCULAR; INTRAVENOUS at 10:38

## 2022-08-04 RX ADMIN — Medication 15 MG: at 11:28

## 2022-08-04 RX ADMIN — TRANEXAMIC ACID 1100 MG: 100 INJECTION, SOLUTION INTRAVENOUS at 10:44

## 2022-08-04 RX ADMIN — MIDAZOLAM 2 MG: 1 INJECTION INTRAMUSCULAR; INTRAVENOUS at 10:31

## 2022-08-04 RX ADMIN — DEXAMETHASONE SODIUM PHOSPHATE 10 MG: 10 INJECTION, SOLUTION INTRAMUSCULAR; INTRAVENOUS at 11:01

## 2022-08-04 RX ADMIN — OXYCODONE 10 MG: 5 TABLET ORAL at 21:04

## 2022-08-04 RX ADMIN — Medication 10 MG: at 10:54

## 2022-08-04 RX ADMIN — ACETAMINOPHEN 650 MG: 325 TABLET, FILM COATED ORAL at 17:05

## 2022-08-04 RX ADMIN — ONDANSETRON 4 MG: 2 INJECTION INTRAMUSCULAR; INTRAVENOUS at 13:19

## 2022-08-04 ASSESSMENT — PAIN DESCRIPTION - LOCATION
LOCATION: HIP
LOCATION: LEG
LOCATION: HIP
LOCATION: HIP

## 2022-08-04 ASSESSMENT — PAIN DESCRIPTION - PAIN TYPE
TYPE: SURGICAL PAIN
TYPE: SURGICAL PAIN

## 2022-08-04 ASSESSMENT — PAIN DESCRIPTION - DESCRIPTORS
DESCRIPTORS: ACHING;DISCOMFORT
DESCRIPTORS: ACHING
DESCRIPTORS: ACHING;DISCOMFORT
DESCRIPTORS: THROBBING
DESCRIPTORS: ACHING
DESCRIPTORS: ACHING;SORE;SHARP

## 2022-08-04 ASSESSMENT — PAIN DESCRIPTION - FREQUENCY
FREQUENCY: CONTINUOUS
FREQUENCY: CONTINUOUS

## 2022-08-04 ASSESSMENT — PAIN SCALES - GENERAL
PAINLEVEL_OUTOF10: 7
PAINLEVEL_OUTOF10: 6
PAINLEVEL_OUTOF10: 5
PAINLEVEL_OUTOF10: 10
PAINLEVEL_OUTOF10: 10
PAINLEVEL_OUTOF10: 6
PAINLEVEL_OUTOF10: 7
PAINLEVEL_OUTOF10: 10
PAINLEVEL_OUTOF10: 7
PAINLEVEL_OUTOF10: 5

## 2022-08-04 ASSESSMENT — PAIN DESCRIPTION - ONSET
ONSET: ON-GOING
ONSET: ON-GOING

## 2022-08-04 ASSESSMENT — PAIN - FUNCTIONAL ASSESSMENT
PAIN_FUNCTIONAL_ASSESSMENT: PREVENTS OR INTERFERES SOME ACTIVE ACTIVITIES AND ADLS
PAIN_FUNCTIONAL_ASSESSMENT: 0-10

## 2022-08-04 ASSESSMENT — PAIN DESCRIPTION - ORIENTATION
ORIENTATION: LEFT

## 2022-08-04 NOTE — DISCHARGE INSTR - COC
Continuity of Care Form    Patient Name: Lola Baumgarten   :  1960  MRN:  [id number]    Admit date:  2022  Discharge date:  2022    Code Status Order: No Order   Advance Directives:   [facility] Documentation       Date/Time Healthcare Directive Type of Healthcare Directive Copy in [address] Agent's Name Healthcare Agent's Phone Number    22 0825 No, patient does not have an advance directive for healthcare treatment -- -- -- -- --            Admitting Physician:  Marce Phelps MD  PCP: Ronit Leon MD    Discharging Nurse: [facility] Unit/Room#: [address] Phone Number: [phone]    Emergency Contact:   Extended Emergency Contact Information  Primary Emergency Contact: Jung Almazan  Address: [address] [address] Phone: [phone]  Mobile Phone: [phone]  Relation: Spouse   needed? No    Past Surgical History:  Past Surgical History:   Procedure Laterality Date    BREAST SURGERY Right     papilloma. benign    COLONOSCOPY      ENDOMETRIAL ABLATION         Immunization History:   Immunization History   Administered Date(s) Administered    COVID-19, J&J, (age 18y+), IM, 0.5 mL 03/15/2021       Active Problems: There is no problem list on file for this patient.       Isolation/Infection:   Isolation            No Isolation          Patient Infection Status       None to display            Nurse Assessment:  Last Vital Signs: /68   Pulse 68   Temp 98.2 °F (36.8 °C)   Resp 14   Ht 5' 4\" (1.626 m)   Wt 149 lb 0.5 oz (67.6 kg)   SpO2 98%   BMI 25.58 kg/m²     Last documented pain score (0-10 scale): Pain Level: 6  Last Weight:   Wt Readings from Last 1 Encounters:   22 149 lb 0.5 oz (67.6 kg)     Mental Status:  oriented and alert    IV Access:  - None    Nursing Mobility/ADLs:  Walking   Independent  Transfer Independent  Bathing  Independent  Dressing  Independent  Toileting  Independent  Feeding  Independent  Med [address]  Independent  Med Delivery   whole    Wound Care Documentation and Therapy:        Elimination:  Continence: Bowel: Yes  Bladder: Yes  Urinary Catheter: None   Colostomy/Ileostomy/Ileal Conduit: No       Date of Last BM: ***    Intake/Output Summary (Last 24 hours) at 8/4/2022 1112  Last data filed at 8/4/2022 1050  Gross per 24 hour   Intake 350 ml   Output --   Net 350 ml     No intake/output data recorded. Safety Concerns: At Risk for Falls    Impairments/Disabilities:      LT RAQUEL    Nutrition Therapy:  Current Nutrition Therapy:   - Oral Diet:  General    Routes of Feeding: Oral  Liquids: No Restrictions  Daily Fluid Restriction: no  Last Modified Barium Swallow with Video (Video Swallowing Test): not done    Treatments at the Time of Hospital Discharge:   Respiratory Treatments: N/A  Oxygen Therapy:  is not on home oxygen therapy.   Ventilator:    - No ventilator support    Rehab Therapies: Physical Therapy and Occupational Therapy  Weight Bearing Status/Restrictions: No weight bearing restrictions  Other Medical Equipment (for information only, NOT a DME order):  walker  Other Treatments: N/A    Patient's personal belongings (please select all that are sent with patient):  Glasses    RN SIGNATURE:  Electronically signed by Jose Mendes RN on 8/6/22 at 12:03 PM EDT    CASE MANAGEMENT/SOCIAL WORK SECTION    Inpatient Status Date: ***    Readmission Risk Assessment Score:  Readmission Risk              Risk of Unplanned Readmission:  0           Discharging to Facility/ Agency   Name:   Address:  Phone:  Fax:    Dialysis Facility (if applicable)   Name:  Address:  Dialysis Schedule:  Phone:  Fax:    / signature: {Esignature:[id number]}    PHYSICIAN SECTION    Prognosis: {Prognosis:7087690641}    Condition at Discharge: [address] Patient Condition:113200611}    Rehab Potential (if transferring to Rehab): {Prognosis:3855954589}    Recommended Labs or Other Treatments After Discharge: ***    Physician Certification: I certify the above information and transfer of Majo Gordon  is necessary for the continuing treatment of the diagnosis listed and that she requires {Admit to Appropriate Level of Care:00980} for {GREATER/LESS:843470009} 30 days.      Update Admission H&P: {CHP DME Changes in DAJQP:357993244}    PHYSICIAN SIGNATURE:  Electronically signed by Santana De MD on 8/4/22 at 11:12 AM EDT

## 2022-08-04 NOTE — H&P
History and Physical Update    Pt Name: Dominic Current  MRN: [id number]  YOB: 1960  Date of evaluation: 8/4/2022    [x] I have examined the patient and reviewed the H&P/Consult and there are no changes to the patient or plans.     [] I have examined the patient and reviewed the H&P/Consult and have noted the following changes:        Sudeep Mcgowan MD   Electronically signed 8/4/2022 at 9:29 AM

## 2022-08-04 NOTE — PLAN OF CARE
Problem: Discharge Planning  Goal: Discharge to home or other facility with appropriate resources  Outcome: Progressing  Flowsheets (Taken 8/4/2022 0567)  Discharge to home or other facility with appropriate resources: Identify barriers to discharge with patient and caregiver     Problem: Pain  Goal: Verbalizes/displays adequate comfort level or baseline comfort level  Outcome: Progressing     Problem: ABCDS Injury Assessment  Goal: Absence of physical injury  Outcome: Progressing     Problem: Safety - Adult  Goal: Free from fall injury  Outcome: Progressing     Problem: Musculoskeletal - Adult  Goal: Return mobility to safest level of function  Outcome: Progressing  Goal: Maintain proper alignment of affected body part  Outcome: Progressing  Goal: Return ADL status to a safe level of function  Outcome: Progressing

## 2022-08-04 NOTE — PROGRESS NOTES
Pt admitted to room 2110 per bed in good condition from PACU as a potential same day discharge  Oriented to room and surroundings  Bed in lowest position, wheels locked, 2/4 side rails up  Call light in reach, room free of clutter, adequate lighting provided  Denies any further questions at this time  Instructed to call out with any questions/concerns/new onset of pain and/or n/v   White board updated  Continue to monitor with hourly rounding  STAY WITH ME protocol initiated   Bed alarm on/Fall Risk signs in place/Fall risk sticker to wrist band  Non-skid socks on/at bedside

## 2022-08-04 NOTE — DISCHARGE INSTRUCTIONS
the surgical limb also help with pain control. When to call the Surgeon:  Increased redness, warmth, drainage, swelling or odor from incision site. Temperature above 101 degrees. Pain not controlled by prescribed medications. Calf tenderness, swelling, or redness. Shortness of breath or chest pain. If you cannot urinate and have been consuming liquids  Any incision or surgical-related concerns. Call surgeon with concerns PRIOR TO going to hospital.    Normal Conditions:  Swelling in the operative leg: this should reduce over time. Bruising behind the knee and around surgical area. Some post-operative pain. Constipation related to pain medications/decreased mobility. (Increase fiber & water intake.)   Slight warmth of operative leg. Fatigue and moderate pain after therapy. Numbness near the incision site. Nausea - take pain medications with food. Cut back on pain medication. NOTE: Remember to go to follow-up orthopedic appointment with surgeon      Discharge instructions video: [email or web address]    Keep it Clean - Post-Operative Home instructions    These instructions are to help you have the best possible recovery after your surgical procedure. Alline Fothergill is here to support you. If you have questions, call [phone] Monday through Friday from 7:30AM to 8:30PM to speak to a nurse. If you need to speak to someone outside of these hours, call your physician. Incision Dos and Donts  Do wash hands before and after dressing changes or when you have had any contact with your incision. Use hand  or antibacterial soap. Do keep your incision clean and dry. Its OK to wash the skin around your incision with mild soap and water. Do change your dressing as you were told. Do notify your doctor if the dressing becomes wet or dirty. Do use a clean washcloth every time when cleaning your incision. Do sleep on clean linens. Do keep pets away from incision site.   Dont sit

## 2022-08-04 NOTE — ANESTHESIA PRE PROCEDURE
Department of Anesthesiology  Preprocedure Note       Name:  Miguelina Boyle   Age:  63 y.o.  :  1960                                          MRN:  [id number]         Date:  2022      Surgeon: Katlyn Abdalla):  Charmayne Peper, MD    Procedure: Procedure(s):  LEFT HIP TOTAL ARTHROPLASTY ANTERIOR APPROACH -  BIOMET    Medications prior to admission:   Prior to Admission medications    Medication Sig Start Date End Date Taking? Authorizing Provider   ondansetron (ZOFRAN) 4 MG tablet Take 1 tablet by mouth every 6 hours as needed for Nausea 22  Yes Charmayne Peper, MD   docusate sodium (COLACE) 100 MG capsule Take 1 capsule by mouth in the morning and 1 capsule before bedtime. 22  Yes Charmayne Peper, MD   oxyCODONE-acetaminophen (PERCOCET) 5-325 MG per tablet Take 1-2 tablets by mouth every 4 hours as needed for Pain for up to 7 days. 22 Yes Charmayne Peper, MD   aspirin 325 MG EC tablet Take 1 tablet by mouth in the morning and 1 tablet before bedtime. Do all this for 28 days. 22 Yes Charmayne Peper, MD   meloxicam (MOBIC) 15 MG tablet Take 15 mg by mouth in the morning. 22   Historical Provider, MD   valACYclovir (VALTREX) 1 g tablet Take 1,000 mg by mouth as needed 21   Historical Provider, MD   cetirizine (ZYRTEC) 10 MG tablet Take 10 mg by mouth in the morning.     Historical Provider, MD   Cholecalciferol (VITAMIN D3 PO) Take 1 tablet by mouth daily    Historical Provider, MD       Current medications:    Current Facility-Administered Medications   Medication Dose Route Frequency Provider Last Rate Last Admin    lidocaine PF 1 % injection 1 mL  1 mL IntraDERmal Once PRN Jesse Quigley MD        0.9 % sodium chloride infusion   IntraVENous Continuous Jesse Quigley MD        lactated ringers infusion   IntraVENous Continuous Jesse Quigley  mL/hr at 22 0852 New Bag at 22 0852    sodium chloride flush 0.9 % injection 5-40 mL  5-40 mL IntraVENous 2 times per day Adam Banks MD        sodium chloride flush 0.9 % injection 5-40 mL  5-40 mL IntraVENous PRN Adam Banks MD        0.9 % sodium chloride infusion   IntraVENous PRN Adam Banks MD        ceFAZolin (ANCEF) 2000 mg in dextrose 5 % 50 mL IVPB  2,000 mg IntraVENous Once Alejandro Marrufo MD           Allergies: Allergies   Allergen Reactions    Sulfa Antibiotics Rash       Problem List:  There is no problem list on file for this patient. Past Medical History:        Diagnosis Date    Arthritis     osteoarthritis       Past Surgical History:        Procedure Laterality Date    BREAST SURGERY Right     papilloma. benign    COLONOSCOPY      ENDOMETRIAL ABLATION         Social History:    Social History     Tobacco Use    Smoking status: Former     Types: Cigarettes     Start date:      Quit date:      Years since quittin.6    Smokeless tobacco: Never   Substance Use Topics    Alcohol use: Yes     Comment: socially                                Counseling given: Not Answered      Vital Signs (Current):   Vitals:    22 0818 22 0821   BP: 125/68    Pulse: 68    Resp: 14    Temp: 98.2 °F (36.8 °C)    SpO2: 98%    Weight:  149 lb 0.5 oz (67.6 kg)   Height:  5' 4\" (1.626 m)                                              BP Readings from Last 3 Encounters:   22 125/68   22 (!) 146/69       NPO Status: Time of last liquid consumption:                         Time of last solid consumption:                         Date of last liquid consumption: 22                        Date of last solid food consumption: 22    BMI:   Wt Readings from Last 3 Encounters:   22 149 lb 0.5 oz (67.6 kg)   22 149 lb 0.5 oz (67.6 kg)     Body mass index is 25.58 kg/m².     CBC:   Lab Results   Component Value Date/Time    WBC 5.1 2022 08:22 AM    RBC 4.29 2022 08:22 AM    HGB 11.8 2022 08:22 AM    HCT 38.1 2022 08:22 AM    MCV 88.8 07/21/2022 08:22 AM    RDW 13.6 07/21/2022 08:22 AM     07/21/2022 08:22 AM       CMP:   Lab Results   Component Value Date/Time     07/21/2022 08:22 AM    K 4.3 07/21/2022 08:22 AM     07/21/2022 08:22 AM    CO2 29 07/21/2022 08:22 AM    BUN 16 07/21/2022 08:22 AM    CREATININE 0.70 07/21/2022 08:22 AM    GFRAA >60 07/21/2022 08:22 AM    LABGLOM >60 07/21/2022 08:22 AM       POC Tests: No results for input(s): POCGLU, POCNA, POCK, POCCL, POCBUN, POCHEMO, POCHCT in the last 72 hours. Coags: No results found for: PROTIME, INR, APTT    HCG (If Applicable): No results found for: PREGTESTUR, PREGSERUM, HCG, HCGQUANT     ABGs: No results found for: PHART, PO2ART, VJZ5PMQ, HAN2SEF, BEART, B0BFSXII     Type & Screen (If Applicable):  No results found for: LABABO, LABRH    Drug/Infectious Status (If Applicable):  No results found for: HIV, HEPCAB    COVID-19 Screening (If Applicable): No results found for: COVID19        Anesthesia Evaluation  Patient summary reviewed and Nursing notes reviewed no history of anesthetic complications:   Airway: Mallampati: II  TM distance: >3 FB   Neck ROM: full  Mouth opening: > = 3 FB   Dental: normal exam         Pulmonary:normal exam        (-) COPD                           Cardiovascular:  Exercise tolerance: good (>4 METS),       (-) past MI and CAD        Rate: normal                    Neuro/Psych:      (-) TIA and CVA           GI/Hepatic/Renal:        (-) GERD       Endo/Other:    (+) : arthritis:., .                 Abdominal:             Vascular: Other Findings:           Anesthesia Plan      general     ASA 2       Induction: intravenous. MIPS: Prophylactic antiemetics administered. Anesthetic plan and risks discussed with patient. Plan discussed with CRNA.     Attending anesthesiologist reviewed and agrees with Preprocedure content                Paul Thomas DO   8/4/2022

## 2022-08-04 NOTE — PROGRESS NOTES
Orthopedic Coordinator Note    Patient s/p left total Hip replacement on 08/04/2022 WITH DR. Liliana Stiles. The following appointments are currently scheduled:    Post-op with surgeon 08/17/2022 AT 0945, PT IS TO ARRIVE EARLY FOR XRAY. Physical Therapy UNSURE      Wheeled walker order is  entered  Face to face documentation is ENTERED.        Any questions please contact Macy Minor RN, BSN      Electronically signed by: Macy Minor RN on 8/4/2022 at 8:08 AM

## 2022-08-04 NOTE — ANESTHESIA POSTPROCEDURE EVALUATION
Department of Anesthesiology  Postprocedure Note    Patient: So Avery  MRN: [id number]  YOB: 1960  Date of evaluation: 8/4/2022      Procedure Summary     Date: 08/04/22 Room / Location: [address] [place] [address] / [facility] - INPATIENT    Anesthesia Start: 1033 Anesthesia Stop: 1400    Procedure: LEFT HIP TOTAL ARTHROPLASTY ANTERIOR APPROACH -  BIOMET (Left: Hip) Diagnosis:       Osteoarthritis of left hip, unspecified osteoarthritis type      (Osteoarthritis of left hip, unspecified osteoarthritis type [M16.12])    Surgeons: Linus Zimmerman MD Responsible Provider: Pennie Jose DO    Anesthesia Type: general ASA Status: 2          Anesthesia Type: No value filed.     Leoncio Phase I: Leoncio Score: 8    Leoncio Phase II:        Anesthesia Post Evaluation    Patient location during evaluation: PACU  Patient participation: complete - patient participated  Level of consciousness: awake and alert  Airway patency: patent  Nausea & Vomiting: no nausea and no vomiting  Complications: no  Cardiovascular status: hemodynamically stable  Respiratory status: acceptable  Hydration status: stable

## 2022-08-04 NOTE — CARE COORDINATION
S/P lt total hip replacement per Dr. Jessie Lindsey. Spoke to Wilmer Li with [facility] and face sheet, script for walker and face to face note faxed. Informed of D/C today and will be delivered to patients room.

## 2022-08-04 NOTE — PROGRESS NOTES
Occupational Therapy  Facility/Department: [facility]  Occupational Therapy Initial Assessment    Name: Delmi Lorenzo  : 1960  MRN: [id number]  Date of Service: 2022    SHAVONNE Alexis reports patient is medically stable for therapy treatment this date. Chart reviewed prior to treatment and patient is agreeable for therapy. All lines intact and patient positioned comfortably at end of treatment. All patient needs addressed prior to ending therapy session. Discharge Recommendations:    OT Equipment Recommendations  Equipment Needed: Yes  Mobility Devices: ADL Assistive Devices  ADL Assistive Devices: Long-handled Shoe Horn;Reacher;Sock-Aid Hard;Long-handled Sponge       Patient Diagnosis(es): The encounter diagnosis was Acute postoperative pain. Past Medical History:  has a past medical history of Arthritis. Past Surgical History:  has a past surgical history that includes Breast surgery (Right); Colonoscopy; and Endometrial ablation. ANOOP Arita       Assessment   Performance deficits / Impairments: Decreased functional mobility ; Decreased ADL status; Decreased endurance;Decreased high-level IADLs;Decreased safe awareness;Decreased balance;Decreased posture;Decreased sensation  Assessment: Skilled OT services are currently indicated to increase I and safety during functional tasks to return home at prior level of function as able. Prognosis: Good  Decision Making: Medium Complexity  Activity Tolerance  Activity Tolerance: Patient Tolerated treatment well  Activity Tolerance Comments: fair-, Pt very cooperative and engaged in therapy session, limited by drop in BP/becoming not responsive unable to progress mobility at this time.         Plan   Plan  Times per Week: 5-6x/wk 1x/day as tirso  Current Treatment Recommendations: Strengthening, Balance training, Functional mobility training, Endurance training, Safety education & training, Equipment evaluation, education, & procurement, Self-Care / ADL, Home management training     Restrictions  Restrictions/Precautions  Restrictions/Precautions: General Precautions, Fall Risk, Weight Bearing  Required Braces or Orthoses?: No  Lower Extremity Weight Bearing Restrictions  Left Lower Extremity Weight Bearing: Weight Bearing As Tolerated  Position Activity Restriction  Other position/activity restrictions: Up w/ assist, RACHELLE hose, RUE IV    Subjective   General  Chart Reviewed: Yes  Patient assessed for rehabilitation services?: Yes  Family / Caregiver Present: Yes (Pts  in room at end of session)  Subjective  Subjective: Pt resting in bed, pleasant and agreeable to OT eval     Social/Functional History  Social/Functional History  Lives With: Spouse, Son  Type of Home: House  Home Layout: Two level, Bed/Bath upstairs, 1/2 bath on main level  Home Access: Stairs to enter without rails  Entrance Stairs - Number of Steps: 2  Bathroom Shower/Tub: Walk-in shower (small lip)  Bathroom Toilet: Handicap height  Bathroom Equipment: Grab bars in shower, Shower chair, Hand-held shower, Toilet raiser  Home Equipment: Christophe Au, rolling, Cane  Has the patient had two or more falls in the past year or any fall with injury in the past year?: No  ADL Assistance: Independent  Homemaking Assistance: Independent  Homemaking Responsibilities: Yes  Ambulation Assistance: Independent (No AD use prior to surgery)  Transfer Assistance: Independent  Active : Yes  Occupation: Full time employment  Type of Occupation: life  for [employer]  Leisure & Hobbies: working out at [employer], going for walks       Objective   Observation/Palpation  Posture: Fair (sitting at EOB)  Observation: surgical dressing intact, B RACHELLE hose on upon arrival, BP supine in bed prior to mobility: 124/59mmHg (map 75)  Safety Devices  Type of Devices: Bed alarm in place;Call light within reach; Left in bed;Gait belt;Nurse notified  Restraints  Restraints Initially in Place: No      Balance  Sitting:  (CGA)  Standing:  (Min x2 staff assist with RW for first stand after sx)  Functional Mobility   Overall Level of Assistance:  (Pt unable to take steps at this time)     AROM: Within functional limits  PROM: Within functional limits  Strength: Generally decreased, functional (~ 4+/5)  Coordination: Within functional limits  Tone: Normal  Sensation: Intact (Pt reporting N/T in surgical leg)      ADL  Feeding: Setup  Grooming: Setup;Stand by assistance (seated)  UE Bathing: Setup;Stand by assistance  LE Bathing: Setup; Moderate assistance  UE Dressing: Setup;Minimal assistance (to tie/adjust hosp gown seated on EOB)  LE Dressing: Setup;Maximum assistance (for donning B socks while seated on EOB)  Toileting: Minimal assistance  Additional Comments: Pt educated on safe ADL completion stratigies including sitting vs stand, EC/WS tech, dressing surgical LE first, use of medicated soap for infection prevention, RACHELLE hose purpose/care. Pt verbalized good understanding of all education provided. Activity Tolerance  Activity Tolerance: Treatment limited secondary to medical complications  Activity Tolerance Comments: Activity limited 2/2 hypotension, see transfer section for details. Bed mobility  Supine to Sit: Minimal assistance  Sit to Supine: Maximum assistance;2 Person assistance  Scooting: Minimal assistance;2 Person assistance  Bed Mobility Comments: Pt with difficulty throughout bed mobility this date, required increased tiem/effort. Pt given Mod verbal cues for paicng self, pursed lip breathing, use of bedrails, use of sheet as leg . Upon sitting EOB pt reporting feeling \"woozy\" BP taken 122/62 (MAP 73). After ~ 5-6 minutes pt reporting symptoms improved. Once returning to bed, attempted to raise [facility] in order for pt to eat dinner, pt reporting increased dizziness. Pt returned to supine and RN notified.       Transfers  Sit to stand: Minimal assistance;2 Person assistance  Stand to sit: Minimal assistance;2 Person assistance  Transfer Comments: Pt stood at EOB with RW and x2 staff assist. Pt reporting feeling light headed, Unable to obtain BP in standing d/t pt becameing pale and unresponsive when asked questions. Pt assisted back into sitting positon on EOB symptoms not resolving pt then assisted back into supine. Pt placed into reverse trendelburg position and BP assessed  112/57 (Map 71). Vision  Vision: Impaired (Driving; Pt denies any recent visual changes)  Vision Exceptions: Wears glasses for distance  Hearing  Hearing: Within functional limits      Cognition  Overall Cognitive Status: WFL  Safety Judgement: Decreased awareness of need for assistance;Decreased awareness of need for safety  Orientation  Overall Orientation Status: Within Functional Limits                  Education Given To: Patient  Education Provided: Role of Therapy;Transfer Training;Plan of Care;Energy Conservation; ADL Adaptive Strategies; Fall Prevention Strategies  Education Provided Comments: safety in function, fall prevention/call light use, benefits of being oob, pursed lip breathing tech, recommendations for continued therapy, RACHELLE hose wear/care, RW use/safety  Education Method: Verbal  Barriers to Learning: None  Education Outcome: Verbalized understanding      LUE AROM (degrees)  LUE AROM : WFL  RUE AROM (degrees)  RUE AROM : WFL             AM-PAC Score        AM-PAC Inpatient Daily Activity Raw Score: 17 (08/04/22 1735)  AM-PAC Inpatient ADL T-Scale Score : 37.26 (08/04/22 1735)  ADL Inpatient CMS 0-100% Score: 50.11 (08/04/22 1735)  ADL Inpatient CMS G-Code Modifier : CK (08/04/22 1735)           Goals  Short Term Goals  Time Frame for Short term goals: By discharge, pt to demo  Short Term Goal 1: tolerance for reassessment of funcitonal mobility when appropriate to add goals to POC. Short Term Goal 2: bed mobility to SBA with use of bedrails as needed while maintaing RAQUEL precuations.   Short Term Goal 3: UB ADLs to SBA and LB ADLs to Min A with use of AD/AE as needed. Short Term Goal 4: toileting to SBA with use of AD/grab bars as needed. Short Term Goal 5: ADL transfers to SBA with use of AD as needed. Long Term Goals  Long Term Goal 1: I with fall prevention education, EC/WS tech, recommendations for AE, RAQUEL precuations, safe car transfers and RACHELLE hose wear/purpose with use of handouts as needed. Patient Goals   Patient goals : To go home! Therapy Time   Individual Concurrent Group Co-treatment   Time In 1622         Time Out 1720         Minutes 58          Tx time: 45 min     Co-treatment with PT warranted first time up day of surgery. Cotx due to potential risk of decreased sensation, muscle control and proprioception from spinal epidural and/or regional block. Decreased safety and independence requiring 2 skilled therapy professionals to address individual discipline's goals.           Modesto Sanchez, OT

## 2022-08-04 NOTE — PROGRESS NOTES
Informed by pt/ot, patient lost consciousness during therapy. Unable to get a blood pressure while lost consciousness, was assisted back to bed, placed in trendelenburg, /57, sat up in bed- /62. Eyvonne Gaucher, orthopedic coordinator notified. Dr. Moni Bassett notified and to bedside, gives verbal order for fluid bolus 500ml. Eyvonne Gaucher also notified Dr. Juan Manuel Mcclelland of patient staying overnight d/t above.

## 2022-08-04 NOTE — PROGRESS NOTES
Physical Therapy  Facility/Department: [facility] MED SURG  Physical Therapy Initial Assessment - Day of Surgery     Name: Ross Leary  : 1960  MRN: [id number]  Date of Service: 2022  RN [facility] reports patient is medically stable for therapy treatment this date. Chart reviewed prior to treatment and patient is agreeable for therapy. All lines intact and patient positioned comfortably at end of treatment. All patient needs addressed prior to ending therapy session. H&P / Procedure: L RAQUEL - anterior approach 2022, Dr. Rebeca Gage    Discharge Recommendations:  Pt presenting with new musculoskeletal dysfunction and would benefit from additional therapy at time of discharge. Please refer to the AM-PAC score for current functional status. Patient would benefit from continued therapy after discharge     PT Equipment Recommendations  Equipment Needed: No (Pt owns RW)      Patient Diagnosis(es): The encounter diagnosis was Acute postoperative pain. Past Medical History:  has a past medical history of Arthritis. Past Surgical History:  has a past surgical history that includes Breast surgery (Right); Colonoscopy; and Endometrial ablation. Assessment   Body Structures, Functions, Activity Limitations Requiring Skilled Therapeutic Intervention: Decreased functional mobility ; Decreased ADL status; Decreased balance;Decreased endurance;Decreased safe awareness;Decreased strength; Increased pain;Decreased high-level IADLs  Assessment: Pt tolerated PT eval poor. Activity limited d/t symptomatic hypotension throughout session. Unable attempt pre-gait activities or ambulation this date. Pt will require a reassessment for ambulation next visit. Pt would benefit from continued skilled PT to address ROM, balance, and strength deficits s/p L RAQUEL in order to return to PLOF.   Specific Instructions for Next Treatment: REASSESS AMBULATION  Therapy Prognosis: Good  Decision Making: Medium Complexity  Requires PT Follow-Up: Yes  Activity Tolerance  Activity Tolerance: Treatment limited secondary to medical complications  Activity Tolerance Comments: Activity limited 2/2 hypotension, see transfer section for details. Plan   Plan  Plan: 2 times a day 7 days a week (ORTHO Pecola Goldberg)  Specific Instructions for Next Treatment: REASSESS AMBULATION  Current Treatment Recommendations: Strengthening, ROM, Balance training, Functional mobility training, Transfer training, Gait training, Neuromuscular re-education, Stair training, Endurance training, Pain management, Home exercise program, Safety education & training, Patient/Caregiver education & training, Equipment evaluation, education, & procurement, Therapeutic activities  Safety Devices  Type of Devices: Bed alarm in place, Call light within reach, Left in bed, Gait belt, Nurse notified  Restraints  Restraints Initially in Place: No     Restrictions  Restrictions/Precautions  Restrictions/Precautions: General Precautions, Fall Risk, Weight Bearing  Required Braces or Orthoses?: No  Lower Extremity Weight Bearing Restrictions  Left Lower Extremity Weight Bearing: Weight Bearing As Tolerated  Position Activity Restriction  Other position/activity restrictions: Up w/ assist, RACHELLE hose, RUE IV     Subjective   General  Patient assessed for rehabilitation services?: Yes  Response To Previous Treatment: Not applicable  Family / Caregiver Present: Yes (Pt's  present at bedside throughout session)  Follows Commands: Within Functional Limits  General Comment  Comments: RN and pt agreeable to therapy. Pt supine in bed upon arrival.  Pt pleasant and cooperative throughout. Subjective  Subjective: Pt reporting feeling \"okay\" at time of PT eval, states she had a lot of pain right after surgery but it is controlled more now.          Social/Functional History  Social/Functional History  Lives With: Spouse, Son  Type of Home: House  Home Layout: Two level, Bed/Bath upstairs, 1/2 bath on main level  Home Access: Stairs to enter without rails  Entrance Stairs - Number of Steps: 2  Bathroom Shower/Tub: Walk-in shower (small lip)  Bathroom Toilet: Handicap height  Bathroom Equipment: Grab bars in shower, Shower chair, Hand-held shower, Toilet raiser  Home Equipment: [place], rolling, Cane  Has the patient had two or more falls in the past year or any fall with injury in the past year?: No  ADL Assistance: [address] [place] [address] Avenue: Independent  Homemaking Responsibilities: Yes  Ambulation Assistance: Independent (No AD use prior to surgery)  Transfer Assistance: Independent  Active : Yes  Occupation: Full time employment  Type of Occupation: life  for [employer]  Leisure & Hobbies: working out at [employer], going for walks  Vision/Hearing  Vision  Vision: Impaired (Driving; Pt denies any recent visual changes)  Vision Exceptions: Wears glasses for distance  Hearing  Hearing: Within functional limits    Cognition   Orientation  Overall Orientation Status: Within Functional Limits  Cognition  Overall Cognitive Status: WFL  Safety Judgement: Decreased awareness of need for assistance;Decreased awareness of need for safety     Objective      Observation/Palpation  Posture: Fair (sitting at EOB)  Observation: surgical dressing intact, B RACHELLE hose on upon arrival, BP supine in bed prior to mobility: 124/59mmHg (map 75)  Gross Assessment  Sensation: Impaired (Pt reports \"my leg is cesar numb I think\")     AROM RLE (degrees)  RLE AROM: WFL  PROM LLE (degrees)  LLE PROM: WFL  LLE General PROM: Hip flex 0-80 degrees (assessed supine via passive heelslide)  AROM LLE (degrees)  LLE General AROM: Hip flex 0-60 degrees (assessed supine via heelslide)  AROM RUE (degrees)  RUE AROM : WFL  AROM LUE (degrees)  LUE AROM : WFL  Strength RLE  Strength RLE: WFL  Comment: Grossly 5/5  Strength LLE  Comment: Quad set+, glute set+  Strength RUE  Strength RUE: WFL  Comment: See OT assessment for detail  Strength LUE  Strength LUE: WFL  Comment: See OT assessment for detail          Bed mobility  Supine to Sit: Minimal assistance  Sit to Supine: Maximum assistance;2 Person assistance (for progression of BLE & trunk)  Scooting: Minimal assistance;2 Person assistance  Bed Mobility Comments: Pt w/ difficulty throughout bed mobility this date requiring increased time and effort throughout. Pt educated on use of sheet as leg  to maintain L RAQUEL anterior precautions w/ fair return demo. Pt requiring max verbal cueing for pursed lip breathing throughout w/ fair return demo. Upon sitting at EOB, pt reporting \"wooziness\". BP was taken and read 122/62 mmHg (map 73). After ~5-6 minutes, pt reporting symptoms had improved. Upon return to bed & reposititioning pt, writer attempted to raise [facility] in order for pt to sit up for dinner however pt reporting dizziness w/ HOB raised so pt was left supine; RN notified. Transfers  Sit to Stand: Minimal Assistance;2 Person Assistance  Stand to sit: Minimal Assistance;2 Person Assistance  Comment: Pt performed 1 STS transfer throughout session this date requiring min verbal cueing for proper hand placement throughout transfers w/ RW w/ good return demo. Pt also requiring mod verbal cueing to widen SCOT prior to standing as pt initially going to attempt transfer w/ feet together w/ fair return demo. Upon standing, pt reporting increased \"wooziness\". Pt was unable to maintain eyes open to writer so pt was assisted back to sitting EOB and then pt became unresponsive while sitting EOB and immediately assisted to supine and bed was put in [facility]. Pt responding appropriately to staff upon return to bed. [facility] and read 112/57 mmHg (map 71). RN notified. Ambulation  Comments: Unable to attempt ambulation this date d/t pt's hypotension upon standing.      Balance  Posture: Fair  Sitting - Static: Good;-  Sitting - Dynamic: Fair;+  Standing - Static: Fair;-  Single Leg Stance R Le  Single Leg Stance L Le  Comments: Standing balance assessed w/ RW  Exercise Treatment: ankle pumps x 10, quad sets x 10, glute sets x 5, heelslides PROM x 5 AAROM x 5        AM-PAC Score  AM-PAC Inpatient Mobility Raw Score : 11 (22)  AM-PAC Inpatient T-Scale Score : 33.86 (22)  Mobility Inpatient CMS 0-100% Score: 72.57 (22)  Mobility Inpatient CMS G-Code Modifier : CL (22)          Functional Outcome Measure-   Single Leg Stance Test:  0 sec. (<5 sec.= fall risk)      Goals  Short Term Goals  Time Frame for Short term goals: 6 visits  Short term goal 1: Pt to demonstrate bed mobility Gage while maintaining LLE anterior hip precautions  Short term goal 2: Pt to perform STS transfers w/ RW Gage  Short term goal 3: Pt to be indep w/ RAQUEL HEP  Short term goal 4: REASSESS AMBULATION WHEN APPROPRIATE  Patient Goals   Patient goals : To go home       Education  Patient Education  Education Given To: Patient  Education Provided: Role of Therapy;Plan of Care;Precautions; Home Exercise Program;Energy Conservation;Transfer Training;Equipment; Fall Prevention Strategies  Education Provided Comments: Pt educated on: purpose of acute PT eval, importance of continued mobility throughout admission & upon discharge, general safety awareness, pursed lip breathing, use of leg  for bed mobility, RAQUEL anterior approach precautions, RAQUEL HEP, paced breathing for pain control, proper height of RW, safe tranfers w/ RW, use of RACHELLE hose, and PT POC. Pt w/ fair return demo. Pt would benefit from continued reinforcement of education.   Education Method: Verbal;Demonstration;Printed Information/Hand-outs  Education Outcome: Continued education needed;Verbalized understanding      Therapy Time   Individual Concurrent Group Co-treatment   Time In 4289         Time Out 5130         Minutes 47         Treatment time: 45 minutes     Co-treatment with OT warranted first time up day of surgery. Cotx due to potential risk of decreased sensation, muscle control and proprioception from spinal epidural and/or regional block. Decreased safety and independence requiring 2 skilled therapy professionals to address individual discipline's goals.      Macie Stark, PT

## 2022-08-04 NOTE — PROGRESS NOTES
Mode Atwood was evaluated today and a DME order was entered for a wheeled walker because she requires this to successfully complete daily living tasks of ambulating. A wheeled walker is necessary due to the patient's unsteady gait, upper body weakness, and inability to  an ambulation device; and she can ambulate only by pushing a walker instead of a lesser assistive device such as a cane, crutch, or standard walker. The need for this equipment was discussed with the patient and she understands and is in agreement.

## 2022-08-04 NOTE — OP NOTE
Operative Note      Patient: Luke Jay  YOB: 1960  MRN: [id number]    Date of Procedure: 8/4/2022    Pre-Op Diagnosis: Osteoarthritis of left hip, unspecified osteoarthritis type [M16.12]    Post-Op Diagnosis: Same       Procedure(s):  LEFT HIP TOTAL ARTHROPLASTY ANTERIOR APPROACH -  BIOMET    Surgeon(s):  Valerie Epps MD    Assistant:   Surgical Assistant: Zuri Carrizales  Resident: Darrius Lugo DO    Anesthesia: General    Estimated Blood Loss (mL): 330     Complications: None    Specimens:   * No specimens in log *    Implants:  Implant Name Type Inv. Item Serial No.  Lot No. LRB No. Used Action   G7 FINNED 3 HOLE SHELL 50D - [id number]  G7 FINNED 3 HOLE SHELL 50D  [employer]- [id number] Left 1 Implanted   BONE SCREW 6.5X30 SELF-TAP - [id number]  BONE SCREW 6.5X30 SELF-TAP  [employer]- [id number] Left 1 Implanted   LINER ACET 36 MM HIP NEUT POLYETH G7 VIVACIT E - [id number]  LINER ACET 36 MM HIP NEUT POLYETH G7 VIVACIT E  [employer]- [id number] Left 1 Implanted   STEM FEM SZ 11 L107. 5MM 133DEG HIP PPS HI OFFSET TYP 1 TAPR - [id number]  STEM FEM SZ 11 L107. 5MM 133DEG HIP PPS HI OFFSET TYP 1 TAPR  [employer]- [id number] Left 1 Implanted   HEAD FEM [id number] HIP BIOLOX DELT OPT FOR G7 ACET SYS - [id number]  HEAD FEM [id number] HIP BIOLOX DELT OPT FOR G7 ACET SYS  [employer]- [id number] Left 1 Implanted   IMPL HIP HEAD FEM BIOLOX -6 NECK TAPR - [id number] Hip IMPL HIP HEAD FEM BIOLOX -6 NECK TAPR  [employer]-PMM [id number] Left 1 Implanted         Drains: * No LDAs found *    Findings: Severe degenerative joint disease    Detailed Description of Procedure: This is a 81-year-old female with a known diagnosis of left hip osteoarthritis. She had longstanding pain which is failed attempted conservative management. She is elected undergo a total hip arthroplasty for treatment of her problem.   After informed consent was medial teardrop. Once we got medial enough I then reamed aligned aligned with a size 50 reamer. We then opened up a size 50 cup and the 50 was impacted into position. A single screw was placed in the posterior superior quadrant giving us excellent backup fixation. A trial liner was then placed in the acetabulum. We then turned our attention to the femur. The femur was placed in figure-of-four position and the pubic femoral ligaments were peeled off of the medial calcar. We identified the lesser trochanter. I felt our femoral neck length was a good cut. We then also released the superior capsule off of the saddle area of the greater trochanter. Our Omni-Tract hook was then placed posterior to the femur and our retractor was placed on the table. The patient had her legs dropped and she was placed in slight Trendelenburg. The hip was then exposed and the Omni-Tract retractor lifted the hip anteriorly. Once we got full exposure of the proximal femur our cookie cutter was used to gain lateral entry into the proximal femur. Canal finder was used to find the canal.  A mechanical lateralizing reamer was used. We then began broaching with a size 4 and sequentially increased all the way up to a size 11. We then trialed a -6 head. We had excellent stability in all directions and our leg lengths were restored. The hip was then dislocated. The trials were removed from the femur and our trial liner was removed from the acetabulum. We then impacted our vitamin E impregnated acetabular liner. Once this was impacted into place we then placed our size 11 high offset femoral component. Once this was impacted into place we impacted our -6 ceramic head. The hip was then reduced. Stability was checked and we are found to be stable in all directions. Leg lengths were restored. X-rays were taken confirming adequate position and sizing of our implants.   Irrigation was used to thoroughly irrigate the operative field with Betadine followed by a jet lavage. The fascia overlying the tensor fascia was closed with a #1 strata fix. The deep tissues were then run with a 2 oh V-Loc suture. The skin was closed with a 3-0 Monocryl followed by skin glue and an Aquacel dressing. The patient was transported to recovery in stable condition.     Electronically signed by Scar Padron MD on 8/4/2022 at 1:23 PM

## 2022-08-04 NOTE — INTERVAL H&P NOTE
Interval H&P Note    Pt Name: Ana Maria   MRN: [id number]  YOB: 1960  Date of evaluation: 8/4/2022      [x] I have reviewed in epic the H&P by DUSTIN Parker CNP done in [facility] for an Interval History and Physical note. [x] I have examined  Marixa Almazan  There are no changes to the patient who is scheduled for LEFT HIP TOTAL ARTHROPLASTY ANTERIOR APPROACH -  BIOMET by Abril Martinez MD for Osteoarthritis of left hip, unspecified osteoarthritis type [M16.12]. The patient denies new health changes, fever, chills, wheezing, cough, increased SOB, chest pain, open sores or wounds. No DM  Last Mobic 7/27/22    Vital signs: /68   Pulse 68   Temp 98.2 °F (36.8 °C)   Resp 14   Ht 5' 4\" (1.626 m)   Wt 149 lb 0.5 oz (67.6 kg)   SpO2 98%   BMI 25.58 kg/m²     Allergies:  Sulfa antibiotics    Medications:    Prior to Admission medications    Medication Sig Start Date End Date Taking? Authorizing Provider   meloxicam (MOBIC) 15 MG tablet Take 15 mg by mouth in the morning. 6/30/22   Historical Provider, MD   valACYclovir (VALTREX) 1 g tablet Take 1,000 mg by mouth as needed 7/27/21   Historical Provider, MD   cetirizine (ZYRTEC) 10 MG tablet Take 10 mg by mouth in the morning. Historical Provider, MD   Cholecalciferol (VITAMIN D3 PO) Take 1 tablet by mouth daily    Historical Provider, MD         This is a 63 y.o. female who is pleasant, cooperative, alert and oriented x3, in no acute distress. Heart: Heart sounds are normal.  HR 68 regular rate and rhythm without murmur, gallop or rub. Lungs: Normal respiratory effort with equal expansion, good air exchange, unlabored and clear to auscultation without wheezes or rales bilaterally   Abdomen: soft, nontender, nondistended with bowel sounds .        Labs:  Recent Labs     07/21/22  0822   HGB 11.8*   HCT 38.1   WBC 5.1   MCV 88.8         K 4.3      CO2 29   BUN 16   CREATININE 0.70       No results for input(s): COVID19 in the last 720 hours.     Alana Riedel, APRN - CNP  Electronically signed 8/4/2022 at 8:59 AM

## 2022-08-05 PROBLEM — R42 LIGHTHEADEDNESS: Status: ACTIVE | Noted: 2022-08-05

## 2022-08-05 PROBLEM — Z96.642 S/P TOTAL LEFT HIP ARTHROPLASTY: Status: ACTIVE | Noted: 2022-08-05

## 2022-08-05 PROBLEM — M16.12 PRIMARY LOCALIZED OSTEOARTHRITIS OF LEFT HIP: Status: ACTIVE | Noted: 2022-08-05

## 2022-08-05 LAB
ABSOLUTE EOS #: 0.04 K/UL (ref 0–0.44)
ABSOLUTE IMMATURE GRANULOCYTE: 0.04 K/UL (ref 0–0.3)
ABSOLUTE LYMPH #: 1.76 K/UL (ref 1.1–3.7)
ABSOLUTE MONO #: 1.18 K/UL (ref 0.1–1.2)
BASOPHILS # BLD: 1 % (ref 0–2)
BASOPHILS ABSOLUTE: 0.05 K/UL (ref 0–0.2)
EOSINOPHILS RELATIVE PERCENT: 0 % (ref 1–4)
HCT VFR BLD CALC: 27.3 % (ref 36.3–47.1)
HEMOGLOBIN: 8.6 G/DL (ref 11.9–15.1)
IMMATURE GRANULOCYTES: 0 %
LYMPHOCYTES # BLD: 17 % (ref 24–43)
MCH RBC QN AUTO: 28.1 PG (ref 25.2–33.5)
MCHC RBC AUTO-ENTMCNC: 31.5 G/DL (ref 28.4–34.8)
MCV RBC AUTO: 89.2 FL (ref 82.6–102.9)
MONOCYTES # BLD: 11 % (ref 3–12)
NRBC AUTOMATED: 0 PER 100 WBC
PDW BLD-RTO: 13.8 % (ref 11.8–14.4)
PLATELET # BLD: 187 K/UL (ref 138–453)
PMV BLD AUTO: 8.8 FL (ref 8.1–13.5)
RBC # BLD: 3.06 M/UL (ref 3.95–5.11)
SEG NEUTROPHILS: 71 % (ref 36–65)
SEGMENTED NEUTROPHILS ABSOLUTE COUNT: 7.27 K/UL (ref 1.5–8.1)
WBC # BLD: 10.3 K/UL (ref 3.5–11.3)

## 2022-08-05 PROCEDURE — 97530 THERAPEUTIC ACTIVITIES: CPT

## 2022-08-05 PROCEDURE — 99218 PR INITIAL OBSERVATION CARE/DAY 30 MINUTES: CPT | Performed by: NURSE PRACTITIONER

## 2022-08-05 PROCEDURE — 6360000002 HC RX W HCPCS: Performed by: ORTHOPAEDIC SURGERY

## 2022-08-05 PROCEDURE — 2580000003 HC RX 258: Performed by: ORTHOPAEDIC SURGERY

## 2022-08-05 PROCEDURE — 97110 THERAPEUTIC EXERCISES: CPT

## 2022-08-05 PROCEDURE — 6370000000 HC RX 637 (ALT 250 FOR IP): Performed by: ORTHOPAEDIC SURGERY

## 2022-08-05 PROCEDURE — 85025 COMPLETE CBC W/AUTO DIFF WBC: CPT

## 2022-08-05 PROCEDURE — 97112 NEUROMUSCULAR REEDUCATION: CPT

## 2022-08-05 PROCEDURE — 97535 SELF CARE MNGMENT TRAINING: CPT

## 2022-08-05 PROCEDURE — 97116 GAIT TRAINING THERAPY: CPT

## 2022-08-05 PROCEDURE — 36415 COLL VENOUS BLD VENIPUNCTURE: CPT

## 2022-08-05 RX ORDER — 0.9 % SODIUM CHLORIDE 0.9 %
1000 INTRAVENOUS SOLUTION INTRAVENOUS ONCE
Status: COMPLETED | OUTPATIENT
Start: 2022-08-05 | End: 2022-08-05

## 2022-08-05 RX ADMIN — OXYCODONE 10 MG: 5 TABLET ORAL at 03:27

## 2022-08-05 RX ADMIN — SODIUM CHLORIDE, PRESERVATIVE FREE 10 ML: 5 INJECTION INTRAVENOUS at 03:19

## 2022-08-05 RX ADMIN — KETOROLAC TROMETHAMINE 30 MG: 30 INJECTION, SOLUTION INTRAMUSCULAR at 09:46

## 2022-08-05 RX ADMIN — ASPIRIN 325 MG: 325 TABLET, DELAYED RELEASE ORAL at 07:48

## 2022-08-05 RX ADMIN — SODIUM CHLORIDE: 9 INJECTION, SOLUTION INTRAVENOUS at 16:31

## 2022-08-05 RX ADMIN — ASPIRIN 325 MG: 325 TABLET, DELAYED RELEASE ORAL at 20:23

## 2022-08-05 RX ADMIN — SODIUM CHLORIDE: 9 INJECTION, SOLUTION INTRAVENOUS at 05:23

## 2022-08-05 RX ADMIN — OXYCODONE 10 MG: 5 TABLET ORAL at 12:22

## 2022-08-05 RX ADMIN — ACETAMINOPHEN 650 MG: 325 TABLET, FILM COATED ORAL at 04:33

## 2022-08-05 RX ADMIN — ACETAMINOPHEN 650 MG: 325 TABLET, FILM COATED ORAL at 09:46

## 2022-08-05 RX ADMIN — KETOROLAC TROMETHAMINE 30 MG: 30 INJECTION, SOLUTION INTRAMUSCULAR at 22:18

## 2022-08-05 RX ADMIN — CEFAZOLIN 2000 MG: 10 INJECTION, POWDER, FOR SOLUTION INTRAVENOUS at 03:26

## 2022-08-05 RX ADMIN — BISACODYL 5 MG: 5 TABLET, COATED ORAL at 07:48

## 2022-08-05 RX ADMIN — KETOROLAC TROMETHAMINE 30 MG: 30 INJECTION, SOLUTION INTRAMUSCULAR at 04:34

## 2022-08-05 RX ADMIN — ACETAMINOPHEN 650 MG: 325 TABLET, FILM COATED ORAL at 15:47

## 2022-08-05 RX ADMIN — OXYCODONE 5 MG: 5 TABLET ORAL at 16:34

## 2022-08-05 RX ADMIN — KETOROLAC TROMETHAMINE 30 MG: 30 INJECTION, SOLUTION INTRAMUSCULAR at 15:47

## 2022-08-05 RX ADMIN — SODIUM CHLORIDE: 9 INJECTION, SOLUTION INTRAVENOUS at 23:52

## 2022-08-05 RX ADMIN — OXYCODONE 10 MG: 5 TABLET ORAL at 07:48

## 2022-08-05 RX ADMIN — SODIUM CHLORIDE 1000 ML: 9 INJECTION, SOLUTION INTRAVENOUS at 11:17

## 2022-08-05 RX ADMIN — ACETAMINOPHEN 650 MG: 325 TABLET, FILM COATED ORAL at 22:18

## 2022-08-05 ASSESSMENT — PAIN SCALES - GENERAL
PAINLEVEL_OUTOF10: 6
PAINLEVEL_OUTOF10: 4
PAINLEVEL_OUTOF10: 5
PAINLEVEL_OUTOF10: 5
PAINLEVEL_OUTOF10: 4
PAINLEVEL_OUTOF10: 5
PAINLEVEL_OUTOF10: 5
PAINLEVEL_OUTOF10: 7
PAINLEVEL_OUTOF10: 5
PAINLEVEL_OUTOF10: 4
PAINLEVEL_OUTOF10: 7

## 2022-08-05 ASSESSMENT — PAIN DESCRIPTION - DESCRIPTORS
DESCRIPTORS: ACHING;DISCOMFORT
DESCRIPTORS: ACHING;DISCOMFORT
DESCRIPTORS: ACHING;DISCOMFORT;HEAVINESS
DESCRIPTORS: ACHING;DISCOMFORT
DESCRIPTORS: ACHING;DISCOMFORT;SHARP

## 2022-08-05 ASSESSMENT — PAIN DESCRIPTION - ORIENTATION
ORIENTATION: LEFT

## 2022-08-05 ASSESSMENT — PAIN DESCRIPTION - PAIN TYPE
TYPE: SURGICAL PAIN

## 2022-08-05 ASSESSMENT — PAIN DESCRIPTION - ONSET
ONSET: ON-GOING

## 2022-08-05 ASSESSMENT — PAIN DESCRIPTION - FREQUENCY
FREQUENCY: CONTINUOUS

## 2022-08-05 ASSESSMENT — PAIN DESCRIPTION - LOCATION
LOCATION: HIP

## 2022-08-05 ASSESSMENT — ENCOUNTER SYMPTOMS
CONSTIPATION: 0
VOMITING: 0
NAUSEA: 0
ABDOMINAL PAIN: 0
COUGH: 0
SHORTNESS OF BREATH: 0
CHEST TIGHTNESS: 0
DIARRHEA: 0

## 2022-08-05 ASSESSMENT — PAIN - FUNCTIONAL ASSESSMENT
PAIN_FUNCTIONAL_ASSESSMENT: PREVENTS OR INTERFERES SOME ACTIVE ACTIVITIES AND ADLS
PAIN_FUNCTIONAL_ASSESSMENT: PREVENTS OR INTERFERES SOME ACTIVE ACTIVITIES AND ADLS

## 2022-08-05 NOTE — PROGRESS NOTES
Occupational Therapy  Facility/Department: [facility] MED SURG  Occupational Therapy  Re-Assessment    Name: Reymundo Jensen  : 1960  MRN: [id number]  Date of Service: 2022    SHAVONNE Combs reports patient is medically stable for therapy treatment this date. Chart reviewed prior to treatment and patient is agreeable for therapy. All lines intact and patient positioned comfortably at end of treatment. All patient needs addressed prior to ending therapy session. Discharge Recommendations:  Patient would benefit from continued therapy after discharge  OT Equipment Recommendations  ADL Assistive Devices: Long-handled Shoe Horn;Reacher;Sock-Aid Hard;Long-handled Sponge       Patient Diagnosis(es): The encounter diagnosis was Acute postoperative pain. Past Medical History:  has a past medical history of Arthritis. Past Surgical History:  has a past surgical history that includes Breast surgery (Right); Colonoscopy; Endometrial ablation; and Total hip arthroplasty (Left, 2022). ANOOP GARCÍA - Dr. Isreal Todd       Assessment   Performance deficits / Impairments: Decreased functional mobility ; Decreased ADL status; Decreased endurance;Decreased high-level IADLs;Decreased safe awareness;Decreased balance;Decreased posture;Decreased sensation  Assessment: Reassessment complete this date. Pt's participation in OOB activity and functional mobility continue to be limited by drops in pt's BP with position changes and feeling dizzy/lightheaded at baseline. Pt able to tolerate more functional mobility than previous session. Continued skilled OT services are indicated to increase I and safety during functional tasks to return home at prior level of function as able. Prognosis: Good  Decision Making: Medium Complexity  REQUIRES OT FOLLOW-UP: Yes  Activity Tolerance  Activity Tolerance: Patient Tolerated treatment well  Activity Tolerance Comments: Pt's overall activity tolerances continues to be fair- this date.  Pt reporting feeling dizziness/lightheadedness throughout session, with intermittent episodes of nausea. Pt's BP dropped with changes in position, requiring supine rest breaks to increase BP, however feelings of dizziness not resolving. RN notified. Pt's mobility unable to progress beyond ADL transfers to [facility] and bedside recliner secondary to the BP changes and pt being symptomatic. Pt was pleasant and motivated to continue to progress mobility. Plan   Plan  Times per Week: 5-6x/wk 1x/day as tirso  Current Treatment Recommendations: Strengthening, Balance training, Functional mobility training, Endurance training, Safety education & training, Equipment evaluation, education, & procurement, Self-Care / ADL, Home management training     Restrictions  Restrictions/Precautions  Restrictions/Precautions: General Precautions, Fall Risk, Weight Bearing  Required Braces or Orthoses?: No  Lower Extremity Weight Bearing Restrictions  Left Lower Extremity Weight Bearing: Weight Bearing As Tolerated  Position Activity Restriction  Other position/activity restrictions: Up w/ assist, RACHELLE hose, RUE IV    Subjective   General  Chart Reviewed: Yes  Patient assessed for rehabilitation services?: Yes  Family / Caregiver Present: No (Pt's  present at middle of session, not in room at session end.)  Subjective  Subjective: Pt resting in bed, pleasant and agreeable to OT reassessment.   General Comment  Comments: Pt c/o of pain/discomfort in LLE at surgical site, pt did not rate pain at this time, stated \"it's been okay this morning\"     Social/Functional History  Social/Functional History  Lives With: Spouse, Son  Type of Home: House  Home Layout: Two level, Bed/Bath upstairs, 1/2 bath on main level  Home Access: Stairs to enter without rails  Entrance Stairs - Number of Steps: 2  Bathroom Shower/Tub: Walk-in shower (small lip)  Bathroom Toilet: Handicap height  Bathroom Equipment: Grab bars in shower, Shower chair, Hand-held shower, Toilet raiser  Home Equipment: Walker, rolling, Cane  Has the patient had two or more falls in the past year or any fall with injury in the past year?: No  ADL Assistance: [address]: Independent  Homemaking Responsibilities: Yes  Ambulation Assistance: Independent (No AD use prior to surgery)  Transfer Assistance: Independent  Active : Yes  Occupation: Full time employment  Type of Occupation: life  for [employer] [address] [place] [address] Road: working out at [employer], going for walks       Objective   Observation/Palpation  Posture: Fair  Observation: RACHELLE hose donned prior to mobility. Many blood pressures taken this date d/t constant dizziness. Supine (at beginning of session) 96/46 mmHg (map 58), sitting in bed (HOB elevated completely) 101/47 mmHg (map 61), sitting at /51 mmHg (map 60), standing 140/100 mmHg (map 107)  bpm.  While standing, pt's dizziness getting worse so pt was assisted back to supine. While supine again, BP was 94/45 mmHg (map 56). At end of session in bedside recliner, BP was 106/37 mmHg (map 54) HR 73 bpm.  RN notified. Safety Devices  Type of Devices: Call light within reach;Gait belt;Nurse notified; Chair alarm in place; Left in chair; All fall risk precautions in place; Patient at risk for falls (Pt left reclined in bedside recliner in order to increase tolerance for upright, OOB activity to progress functional mobility.)  Bed Mobility Training  Bed Mobility Training: Yes  Overall Level of Assistance: Minimum assistance; Moderate assistance;Assist X2 (Pt completed 2 sup>sit transfers with [facility] raised, use of leg  for LLE, and MinAx2. Pt completed 1 sit>sup transfers with Min-ModAx2. Increased assistance required to maintain hip precautions and d/t pt's drop in BP with position changes.)  Interventions: Safety awareness training;Verbal cues; Visual cues (Physical assistance and verbal/visual cues provided throughout bed mobility tasks for adherence to L hip precautions, proper body mechanics, body positioning, activity pacing, and self monitoring of orthostatic hypotension symptoms to increase pt safety.)  Supine to Sit: Minimum assistance;Assist X2  Sit to Supine: Minimum assistance; Moderate assistance;Assist X2    Balance  Sitting:  (CGA progressing to SBA while seated EOB and on BSC)  Standing:  (MinAx2 and use of RW)    Transfer Training  Transfer Training: Yes    Overall Level of Assistance: Minimum assistance;Assist X2  Interventions: Safety awareness training; Tactile cues; Verbal cues (Pt able to complete sit to stand transfers from various surfaces throughout session. Pt used RW and MinAx2 secondary to BP drop with position changes and \"woozy\" feeling present throughout session. Mod tactile/verbal cues provided for RW mgnt and body mechanics to increase safety.)    Sit to Stand: Minimum assistance;Assist X2  Stand to Sit: Minimum assistance;Assist X2    Bed to Chair: Minimum assistance;Assist X2 (Pt complete BSC>bedside recliner transfer with RW and MinAx2. Min VCs provided for RW mgnt, squaring self to recliner, reaching behind for supporting surface, and controlled descent to increase safety with functional movements.)    Toilet Transfer: Minimum assistance;Assist X2 (Pt able to complete EOB>BSC transfer with RW and MinAx2. Provided Mod VCs for RW mgnt and safety, reaching behind to [facility] before sitting, controlled descent, and self-monitoring of othostatic hypotension symptoms, to increase safety and IND with task.)    Functional Mobility  Overall Level of Assistance: Minimum assistance;Assist X2  Interventions: Safety awareness training;Verbal cues; Tactile cues (Pt able to take several forward steps from BSC>bedside recliner with RW and MinAx2.  Mod verbal/tactile cues provided for RW safety and mgnt, sequencing of transfer, squaring self to surface, reaching behind, and controlled descent all to increase safety.)  Assistive Device: Walker, rolling     AROM: Within functional limits  PROM: Within functional limits  Strength: Generally decreased, functional (~ 4+/5)  Coordination: Within functional limits  Tone: Normal  Sensation: Intact (Pt reporting N/T in surgical leg)    ADL  Feeding: Setup  Grooming: Setup;Stand by assistance (seated position)  UE Bathing: Setup;Stand by assistance  LE Bathing: Setup; Moderate assistance  UE Dressing: Setup;Minimal assistance  LE Dressing: Setup;Maximum assistance (For donning bilateral socks while supine in bed)    Toileting: Minimal assistance (For clothing management)  Toileting Skilled Clinical Factors: Engaged pt in toileting tasks during session. Pt able to transfer from EOB > BSC with RW and MinAx2 for increased safety secondary to low BP and feelings of \"wooziness\" throughout session. Min-Mod verbal cues provided for RW mgnt and safety, squaring self to [facility], pursed lip breathing techniques, reaching behind for ADL transfer and controlled descent all to increase safety. Pt able to complete personal hygiene from a seated position with SBA, required Srini for management of hospital gown during toileting tasks. Additional Comments: Provided continued education to pt regarding RACHELLE hose don/doff schedule, EC/WS tech and activity pacing, RW safety and mgnt, and safety when completing functional tasks. Pt demo'd good return of education. Vision  Vision: Impaired (Driving; Pt denies any recent visual changes)  Vision Exceptions: Wears glasses for distance  Hearing  Hearing: Within functional limits  Cognition  Overall Cognitive Status: WFL  Safety Judgement: Decreased awareness of need for assistance;Decreased awareness of need for safety  Orientation  Overall Orientation Status: Within Functional Limits           Education Given To: Patient  Education Provided: Role of Therapy;Transfer Training;Plan of Care;Energy Conservation; ADL Adaptive Strategies; Fall Prevention Strategies;Precautions; Equipment  Education Provided Comments: safety in function, fall prevention/call light use, benefits of being oob, pursed lip breathing tech, recommendations for continued therapy, RACHELLE hose wear/care, RW use/safety, activity progression while in hospital, L RAQUEL precautions, self monitoring of orthostatic hypotension symptoms  Education Method: Verbal  Barriers to Learning: None  Education Outcome: Verbalized understanding  LUE AROM (degrees)  LUE AROM : WFL  RUE AROM (degrees)  RUE AROM : WFL       AM-PAC Score        AM-PAC Inpatient Daily Activity Raw Score: 17 (08/05/22 1148)  AM-PAC Inpatient ADL T-Scale Score : 37.26 (08/05/22 1148)  ADL Inpatient CMS 0-100% Score: 50.11 (08/05/22 1148)  ADL Inpatient CMS G-Code Modifier : CK (08/05/22 1148)      Goals  Short Term Goals  Time Frame for Short term goals: By discharge, pt to demo  Short Term Goal 1: functional mobility with SBA and use of AD as needed. (Added by Arno Lesch, OTR/L)  Short Term Goal 2: bed mobility to SBA with use of bedrails as needed while maintaing RAQUEL precuations. Short Term Goal 3: UB ADLs to SBA and LB ADLs to Min A with use of AD/AE as needed. Short Term Goal 4: toileting to SBA with use of AD/grab bars as needed. Short Term Goal 5: ADL transfers to SBA with use of AD as needed. Long Term Goals  Long Term Goal 1: I with fall prevention education, EC/WS tech, recommendations for AE, RAQUEL precuations, safe car transfers and RACHELLE hose wear/purpose with use of handouts as needed. Patient Goals   Patient goals : To go home!        Therapy Time   Individual Concurrent Group Co-treatment   Time In 0617 (+10 for chart review & RN coordination)         Time Out 0945         Minutes 48+10 = 58          Tx Time = 40 minutes       Arno Lesch, OT

## 2022-08-05 NOTE — CARE COORDINATION
Case Management Initial Discharge Plan  Marixa Almazan,             Met with:patient or spouse/SO to discuss discharge plans. Information verified: address, contacts, phone number, , insurance Yes  PCP: Bharat Dixon MD  Date of last visit: 2021    Insurance Provider: [facility] Choice    Discharge Planning    Living Arrangements:  Spouse/Significant Other   Support Systems:  Spouse/Significant Other    Home has 2 stories  2 stairs to climb to get into front door, 14 stairs to climb to reach second floor  Location of bedroom/bathroom in home  - second floor    Patient able to perform ADL's:Independent    Current Services (outpatient & in home) none  DME equipment: walker, cane, shower chair  DME provider: [facility]    Pharmacy: [facility] Castro in [address] [place] [address] Rd Needed:  Outpatient PT/OT    Patient agreeable to home care: No  [place] of choice provided:  n/a    Prior SNF/Rehab Placement and Facility: No  Agreeable to SNF/Rehab: No  [place] of choice provided: n/a   Evaluation: n/a    Expected Discharge date:     Patient expects to be discharged to:   home  Follow Up Appointment: Best Day/ Time: Monday AM    Transportation provider:   Transportation arrangements needed for discharge: No    Readmission Risk              Risk of Unplanned Readmission:  0             Does patient have a readmission risk score greater than 14?: No  If yes, follow-up appointment must be made within 7 days of discharge. Goal of Care:       Discharge Plan: Met with patient and  at bedside. Lives with , independent and works full time. POD #1 lt total hip replacement per Dr. Pedro Galindo. Rodger at bedside from [facility]. Plan is OP therapy at [facility] in [place]. Evaluation and treat scheduled for 8-10 at 11:30am and pt informed. Possible D/C today pending CBC results due to hypotension and dizziness during therapy.   Post op appointment with Dr. Pedro Galindo  at 9:45am. Electronically signed by Katie Stein RN on 8/5/22 at 11:30 AM EDT

## 2022-08-05 NOTE — CONSULTS
[facility]  Office: [address], DO, Jeremy Luevano, DO, Giovanna Sotelo, DO, Antonia Aguilarsage Blood, DO, Kassi Ramirez MD, Vic Ramesh MD, Arnel Sorenson MD, Claudean Millard, MD,  Becka Drake MD, Madelaine Pruett MD, Daja Pablo, DO, Krishna Machado MD,  Nilda Bañuelos MD, Judy Hopper MD, Verla Kawasaki, DO, Federico Kingsley MD, Lou Torres MD, Veronica Patrick MD, Aníbal Pate, DO, Thaddeus Rg MD, Chencho Shah MD, Ginna Solis, CNP,  Camille Alex, CNP, Francisco Hanson, CNP, Nathanael Eisenberg, CNP, Kayla Pereira PA-C, Christine Bautista, [facility], Fay Teague, CNP, Viktor Sheth, CNP, Minna Mina, CNP, Tim Saint, CNP, Gold Roldan, CNP, Luci Atwood, CNS, Ulises Reyes, [facility], Conrad Blanco, CNP, Bhavesh Pettit, CNP, Alma Woods, [facility] / HISTORY AND PHYSICAL EXAMINATION            Date:   8/5/2022  Patient name:  Nathan Mcdowell  Date of admission:  8/4/2022  7:58 AM  MRN:   [id number]  Account:  [de-identified]  YOB: 1960  PCP:    Ronda Alfonso MD  Room:   [address] [place] [address]  Code Status:    Full Code    Physician Requesting Consult: Wilfredo Aviles MD    Reason for Consult: Postop medical management, hypotension/dizziness    Chief Complaint:     Left hip pain    History Obtained From:     patient, electronic medical record    History of Present Illness:     Patient presents to [facility] on 8/4/2022 for a scheduled left total hip arthroplasty with Dr. Nina Downing. The patient's left hip pain has progressively worsened over the past 1 year. Patient still exercised but has increased pain at times with exercise. Left hip pain was aggravated by laying down, walking long distances, getting in and out of the car and is minimally relieved with Tylenol. Prior treatment includes cortisone injections and physical therapy.    Per report, patient had an episode of fainting yesterday in the evening while working with physical therapy. Today the patient complained of dizziness and lightheadedness again while working with therapy. Patient reports that her dizziness has improved throughout the day today. She thinks that the pain medication is making her lightheadedness worse, as she felt worse 20 minutes after taking her Oxycodone. Past Medical History:     Past Medical History:   Diagnosis Date    Arthritis     osteoarthritis        Past Surgical History:     Past Surgical History:   Procedure Laterality Date    BREAST SURGERY Right     papilloma. benign    COLONOSCOPY      ENDOMETRIAL ABLATION      TOTAL HIP ARTHROPLASTY Left 8/4/2022    LEFT HIP TOTAL ARTHROPLASTY ANTERIOR APPROACH -  BIOMET performed by Marycarmen Bullard MD at [address] [place] [address]        Medications Prior to Admission:     Prior to Admission medications    Medication Sig Start Date End Date Taking? Authorizing Provider   ondansetron (ZOFRAN) 4 MG tablet Take 1 tablet by mouth every 6 hours as needed for Nausea 8/4/22  Yes Marycarmen Bullard MD   docusate sodium (COLACE) 100 MG capsule Take 1 capsule by mouth in the morning and 1 capsule before bedtime. 8/4/22  Yes Marycarmen Bullard MD   oxyCODONE-acetaminophen (PERCOCET) 5-325 MG per tablet Take 1-2 tablets by mouth every 4 hours as needed for Pain for up to 7 days. 8/4/22 8/11/22 Yes Marycarmen Bullard MD   aspirin 325 MG EC tablet Take 1 tablet by mouth in the morning and 1 tablet before bedtime. Do all this for 28 days. 8/4/22 9/1/22 Yes Marycarmen Bullard MD   meloxicam (MOBIC) 15 MG tablet Take 15 mg by mouth in the morning. 6/30/22   Historical Provider, MD   valACYclovir (VALTREX) 1 g tablet Take 1,000 mg by mouth as needed 7/27/21   Historical Provider, MD   cetirizine (ZYRTEC) 10 MG tablet Take 10 mg by mouth in the morning.     Historical Provider, MD   Cholecalciferol (VITAMIN D3 PO) Take 1 tablet by mouth daily    Historical Provider, MD        Allergies:     Sulfa antibiotics    Social History:     Tobacco:    reports that she quit smoking about 27 years ago. Her smoking use included cigarettes. She started smoking about 42 years ago. She has never used smokeless tobacco.  Alcohol:      reports current alcohol use. Drug Use:  reports no history of drug use. Family History:     History reviewed. No pertinent family history. Review of Systems:     Positive and Negative as described in HPI. Review of Systems   Constitutional:  Negative for activity change, chills, fatigue and fever. HENT:  Negative for congestion. Respiratory:  Negative for cough, chest tightness and shortness of breath. Cardiovascular: Negative. Gastrointestinal:  Negative for abdominal pain, constipation, diarrhea, nausea and vomiting. Endocrine: Negative for cold intolerance and polyuria. Genitourinary:  Negative for difficulty urinating. Musculoskeletal:  Positive for arthralgias and gait problem. Negative for myalgias. Skin:  Negative for wound. Neurological:  Positive for dizziness and light-headedness. Negative for numbness. Psychiatric/Behavioral:  Negative for confusion. Physical Exam:     BP (!) 111/50   Pulse 73   Temp 98.2 °F (36.8 °C) (Oral)   Resp 16   Ht 5' 4\" (1.626 m)   Wt 149 lb 0.5 oz (67.6 kg)   SpO2 99%   BMI 25.58 kg/m²   Temp (24hrs), Av °F (36.7 °C), Min:97.7 °F (36.5 °C), Max:98.2 °F (36.8 °C)    No results for input(s): POCGLU in the last 72 hours. Intake/Output Summary (Last 24 hours) at 2022 1924  Last data filed at 2022 0753  Gross per 24 hour   Intake --   Output 400 ml   Net -400 ml       Physical Exam  Vitals and nursing note reviewed. Constitutional:       General: She is not in acute distress. Appearance: Normal appearance. HENT:      Head: Normocephalic. Mouth/Throat:      Mouth: Mucous membranes are moist.   Eyes:      Pupils: Pupils are equal, round, and reactive to light.    Cardiovascular:      Rate and Rhythm: Normal rate and regular rhythm. Pulses: Normal pulses. Heart sounds: Normal heart sounds. No murmur heard. No friction rub. No gallop. Pulmonary:      Effort: Pulmonary effort is normal. No respiratory distress. Breath sounds: Normal breath sounds. No wheezing or rales. Abdominal:      General: Bowel sounds are normal. There is no distension. Palpations: Abdomen is soft. Tenderness: There is no abdominal tenderness. Musculoskeletal:         General: Swelling (left leg) present. No tenderness. Normal range of motion. Cervical back: Normal range of motion. Skin:     General: Skin is warm and dry. Capillary Refill: Capillary refill takes less than 2 seconds. Coloration: Skin is not pale. Neurological:      General: No focal deficit present. Mental Status: She is alert and oriented to person, place, and time. Motor: No weakness. Psychiatric:         Mood and Affect: Mood normal.         Behavior: Behavior normal.         Thought Content:  Thought content normal.         Judgment: Judgment normal.       Investigations:      Laboratory Testing:  Recent Results (from the past 24 hour(s))   CBC with Auto Differential    Collection Time: 08/05/22 11:05 AM   Result Value Ref Range    WBC 10.3 3.5 - 11.3 k/uL    RBC 3.06 (L) 3.95 - 5.11 m/uL    Hemoglobin 8.6 (L) 11.9 - 15.1 g/dL    Hematocrit 27.3 (L) 36.3 - 47.1 %    MCV 89.2 82.6 - 102.9 fL    MCH 28.1 25.2 - 33.5 pg    MCHC 31.5 28.4 - 34.8 g/dL    RDW 13.8 11.8 - 14.4 %    Platelets 531 461 - 904 k/uL    MPV 8.8 8.1 - 13.5 fL    NRBC Automated 0.0 0.0 per 100 WBC    Seg Neutrophils 71 (H) 36 - 65 %    Lymphocytes 17 (L) 24 - 43 %    Monocytes 11 3 - 12 %    Eosinophils % 0 (L) 1 - 4 %    Basophils 1 0 - 2 %    Immature Granulocytes 0 0 %    Segs Absolute 7.27 1.50 - 8.10 k/uL    Absolute Lymph # 1.76 1.10 - 3.70 k/uL    Absolute Mono # 1.18 0.10 - 1.20 k/uL    Absolute Eos # 0.04 0.00 - 0.44 k/uL Basophils Absolute 0.05 0.00 - 0.20 k/uL    Absolute Immature Granulocyte 0.04 0.00 - 0.30 k/uL       Imaging/Diagonstics:  XR PELVIS (1-2 VIEWS)    Result Date: 8/4/2022  Postsurgical changes of left total hip arthroplasty without specific imaging features of immediate postop complication seen. XR HIP LEFT (2-3 VIEWS)    Result Date: 8/4/2022  Postsurgical changes of left total hip arthroplasty without specific imaging features of immediate postop complication seen. Assessment :      Hospital Problems             Last Modified POA    * (Principal) Primary localized osteoarthritis of left hip 8/5/2022 Yes    S/P total left hip arthroplasty 8/5/2022 Yes    Lightheadedness 8/5/2022 Yes       Plan:     Status post left total hip arthroplasty  IV fluids per surgery  Pain medication per surgery- wean as tolerated  Activity and weightbearing status per surgery  Arthritis  Continue pain medication per surgery  Lightheadedness  Continue IV fluids  Limit pain medications  Continue to monitor. Adult diet  Monitor a.m. labs    Consultations:   IP CONSULT TO CASE MANAGEMENT  IP CONSULT TO HOSPITALIST    On this date 8/5/2022 I have spent 24 minutes reviewing previous notes, test results and face to face with the patient discussing the diagnosis and importance of compliance with the treatment plan as well as documenting on the day of the visit. At least 50% of the time documented was spent with the patient to provide counseling and/or coordination of care.     GLORIA Marshall - CNP  8/5/2022  7:24 PM    Copy sent to Dr. Karol Kwon MD

## 2022-08-05 NOTE — PLAN OF CARE
Problem: Discharge Planning  Goal: Discharge to home or other facility with appropriate resources  Outcome: Progressing     Problem: Pain  Goal: Verbalizes/displays adequate comfort level or baseline comfort level  Outcome: Progressing     Problem: ABCDS Injury Assessment  Goal: Absence of physical injury  Outcome: Progressing  Flowsheets (Taken 8/5/2022 1010)  Absence of Physical Injury: Implement safety measures based on patient assessment     Problem: Safety - Adult  Goal: Free from fall injury  Outcome: Progressing  Flowsheets (Taken 8/5/2022 1010)  Free From Fall Injury: Instruct family/caregiver on patient safety  Patient limited by dizziness/lightheadedness exacerbated by therapy     Problem: Musculoskeletal - Adult  Goal: Return mobility to safest level of function  Outcome: Progressing  Goal: Maintain proper alignment of affected body part  Outcome: Progressing  Goal: Return ADL status to a safe level of function  Outcome: Progressing

## 2022-08-05 NOTE — FLOWSHEET NOTE
[facility]  PROGRESS NOTE    Room # 2110/2110-01   Name: Curtis Panchal              Reason for visit: Routine    I visited the patient. Admit Date & Time: 8/4/2022  7:58 AM    Assessment:  Curtis Panchal is a 63 y.o. female. Upon entering the room patient states about their medical condition, states struggles with their medical situation. States worries, fears frustrations. Patient states well , treated well. Patient states good family support, shares about spiritual life, Oriental orthodox background, shares Oriental orthodox beliefs. Patient shares about outside interests. Intervention:   provided a ministry presence, listening and prayer. Outcome:  Patient open to visit. Plan:  Chaplains will remain available to offer spiritual and emotional support as needed. Electronically signed by Chaplain Joseph, on 8/5/2022 at 1:28 PM.  Karla        08/05/22 1326   Encounter Summary   Service Provided For: Patient and family together   [facility]   Support System Spouse   Last Encounter  08/05/22   Complexity of Encounter Moderate   Begin Time 1133   End Time  1146   Total Time Calculated 13 min   Encounter    Type Initial Screen/Assessment   Assessment/Intervention/Outcome   Assessment Calm;Coping   Intervention Active listening;Discussed belief system/Oriental orthodox practices/lester;Discussed illness injury and its impact;Prayer (assurance of)/[place];Sustaining Presence/Ministry of presence   Outcome Expressed Gratitude;Receptive

## 2022-08-05 NOTE — PROGRESS NOTES
Occupational Therapy  Facility/Department: [facility] MED SURG  Rehabilitation Occupational Therapy Daily Treatment Note    Date: 22  Patient Name: Kobi Pina       Room:   MRN: [id number]  Account: [de-identified]   : 1960  (63 y.o.) Gender: female          Past Medical History:  has a past medical history of Arthritis. Past Surgical History:   has a past surgical history that includes Breast surgery (Right); Colonoscopy; Endometrial ablation; and Total hip arthroplasty (Left, 2022). Restrictions  Restrictions/Precautions: General Precautions; Fall Risk;Weight Bearing  Other position/activity restrictions: Up w/ assist, RACHELLE hose, RUE IV  Left Lower Extremity Weight Bearing: Weight Bearing As Tolerated  Required Braces or Orthoses?: No    Subjective  Subjective: \"I just feel 'woosey'. \"  Restrictions/Precautions: General Precautions; Fall Risk;Weight Bearing     Objective     Cognition  Overall Cognitive Status: WFL  Orientation  Overall Orientation Status: Within Functional Limits         ADL  Grooming/Oral Hygiene  Assistance Level: Set-up; Contact guard assist;Verbal cues  Skilled Clinical Factors: patient stood at sink to complete oral hygiene. Patient stood for ~3-4 minutes and appeared to be slightly anxious but reported not feeling \"normal\" when up. Patient required verbal cues for sequencing for hygiene standing at the sink as patient appeared to be uncomforatable standing at the sink without holding onto the RW  Toileting  Assistance Level: Set-up; Contact guard assist  Toilet Transfers  Equipment: Standard toilet;Grab bars  Additional Factors: Verbal cues; Set-up  Assistance Level: Contact guard assist          Functional Mobility  Device: Rolling walker  Activity: To/From bathroom  Assistance Level: Minimal assistance; Requires x 2 assistance  Skilled Clinical Factors: Patient took ~4 steps towards the bathroom then requested to sit secondary to feeling lightheaded.  /53, Map 65, Pulse 78. After rest break, patient continued mobility to the toilet with no difficulties - then patient completed 3-4 steps to the sink and demonstrated 1 LOB requiring Mod A to correct. Once finished at the sink, patient able to complete func mob back to recliner in room. Min A x2 provided throughout secondary to dizziness/lightheadedness to ensure patient safety. Sit to Stand  Assistance Level: Contact guard assist  Stand to Sit  Assistance Level: Contact guard assist         Assessment  Assessment  Activity Tolerance: Treatment limited secondary to medical complications (symtomatic with BP)  Discharge Recommendations: Patient would benefit from continued therapy after discharge  OT Equipment Recommendations  Equipment Needed: Yes  Mobility Devices: ADL Assistive Devices  ADL Assistive Devices: Long-handled Shoe Horn;Reacher;Sock-Aid Hard;Long-handled Sponge  Safety Devices  Safety Devices in place: Yes  Type of devices: All fall risk precautions in place; Left in chair;Call light within reach;Nurse notified; Chair alarm in place; Patient at risk for falls    Patient Education  Education  Education Given To: Patient; Family  Education Provided: Role of Therapy; Mobility Training; Fall Prevention Strategies; Plan of Care;Transfer Training;Energy Conservation;Precautions; Safety;ADL Function  Education Method: Verbal  Barriers to Learning: None  Education Outcome: Continued education needed    Plan  Plan  Times per Week: 5-6x/wk 1x/day as tirso  Times per Day: Daily  Current Treatment Recommendations: Strengthening;Balance training;Functional mobility training; Endurance training; Safety education & training;Equipment evaluation, education, & procurement;Self-Care / ADL; Home management training    Goals  Patient Goals   Patient goals : To go home! Short Term Goals  Time Frame for Short term goals: By discharge, pt to demo  Short Term Goal 1: functional mobility with SBA and use of AD as needed.   Short Term Goal 2: bed mobility to SBA with use of bedrails as needed while maintaing RAQUEL precuations. Short Term Goal 3: UB ADLs to SBA and LB ADLs to Min A with use of AD/AE as needed. Short Term Goal 4: toileting to SBA with use of AD/grab bars as needed. Short Term Goal 5: ADL transfers to SBA with use of AD as needed. Long Term Goals  Long Term Goal 1: I with fall prevention education, EC/WS tech, recommendations for AE, RAQUEL precuations, safe car transfers and RACHELLE hose wear/purpose with use of handouts as needed. AM-PAC Score        AM-PAC Inpatient Daily Activity Raw Score: 17 (08/05/22 1347)  AM-PAC Inpatient ADL T-Scale Score : 37.26 (08/05/22 1347)  ADL Inpatient CMS 0-100% Score: 50.11 (08/05/22 1347)  ADL Inpatient CMS G-Code Modifier : CK (08/05/22 1347)      Therapy Time   Individual Concurrent Group Co-treatment   Time In      0288   Time Out      1339   Minutes      29    Co-treatment with PT warranted secondary to decreased safety and independence requiring 2 skilled therapy professionals to address individual discipline's goals. OT addressing preparation for ADL transfer, sitting balance for increased ADL performance, sitting/activity tolerance, functional reaching, environmental safety/scanning, fall prevention, functional mobility for ADL transfers, and functional UE strength. Upon writer exit, call light within reach, pt retired to chair. All lines intact and patient positioned comfortably. All patient needs addressed prior to ending therapy session. Chart reviewed prior to treatment and patient is agreeable for therapy. RN reports patient is medically stable for therapy treatment this date.        POOJA Diana/ANOOP

## 2022-08-05 NOTE — PROGRESS NOTES
Per therapy report, patient hypotensive, c/o dizziness during session. Supine 96/46, sitting 101/47, standing 140/100  starting to not respond, patient sat back down, 106/37. Dr. Gaurav Arita notified of above. Orders CBC stat and to call him results. 1116: hgb 8.6, Dr. Gaurav Arita informed, orders received for 1L NS bolus, type & screen in case of need for transfusion. 1351: per therapy, when working with patient, patient got dizzy and lightheaded about correction to bathroom and had to sit down. BP was stable. Unable to work with patient again until tomorrow morning, still needs to do stairs but is unsafe at this time. Dr. Gaurav Arita notified. Awaiting response. 1444: Dr. Gaurav Arita orders H/H for tomorrow AM & consult IM.

## 2022-08-05 NOTE — PROGRESS NOTES
Orthopedic Progress Note    Patient:  Majo Gordon  YOB: 1960     63 y.o. female    Subjective:  Patient seen and examined  No complaints or concerns at this time  No issue overnight per nursing. Had an episode of fainting yesterday in the evening with PT. States she feels better this AM after eating and receiving IV bolus of fluids  Pain controlled  Denies fever, HA, CP, SOB, N/V, dysuria  -Bm/+flatus/+void of urine    Vitals reviewed, afebrile    Objective:   Vitals:    08/05/22 0425   BP: (!) 110/49   Pulse: 72   Resp: 17   Temp: 97.7 °F (36.5 °C)   SpO2: 96%     Gen: NAD, cooperative   Cardiovascular: Regular rate, no dependent edema, distal pulses 2+  Respiratory: Chest symmetric, no accessory muscle use, normal respirations, no audible wheezes    MSK:  LLE: Aquacel dressing is clean/dry/intact without saturation. Compartments are soft and compressible. EHL/FHL/TA/GS complex motor intact. Sural, saphenous, superificial/deep peroneal, and plantar nerve distribution SILT. Dorsalis pedis pulse 2+ with BCR. No results for input(s): WBC, HGB, HCT, PLT, INR, PTT, NA, K, BUN, CREATININE, GLUCOSE in the last 72 hours. Invalid input(s): PT   Meds: See rec for complete list    Impression/plan: 63 y.o. female s/p L RAQUEL, POD#1    -WB status: WBAT LLE. Left anterior hip precautions  -Pain control PO/IV Medication. Attempt to Wean IV medications.    -DVT ppx: ASA   -Ice prn  -Encourage Incentive Spirometry use  -PT/OT  -Follow up with Dr. Jessica Fabian 10-14 days after surgery  -Please page ortho with any questions    Reji Daily, DO  Orthopedic Surgery Resident, PGY-4  [place] [address], [facility]

## 2022-08-05 NOTE — PROGRESS NOTES
Physical Therapy  Facility/Department: [facility] MED SURG  Daily Treatment Note  NAME: Marixa Almazan  : 1960  MRN: [id number]    Date of Service: 2022    Discharge Recommendations:  Patient would benefit from continued therapy after discharge   PT Equipment Recommendations  Equipment Needed: No    Patient Diagnosis(es): The encounter diagnosis was Acute postoperative pain. Assessment   Assessment: Patient functional mobility and ambulation is safe and 1 assist but is limited by feeling sytmptoatic with minimal activity such as standing. Activity Tolerance: Treatment limited secondary to medical complications (Symptomatic BP issues)  Equipment Needed: No     Plan    Plan  Plan: 2 times a day 7 days a week  Current Treatment Recommendations: Strengthening;ROM;Balance training;Functional mobility training;Transfer training;Gait training;Neuromuscular re-education;Stair training; Endurance training;Pain management;Home exercise program;Safety education & training;Patient/Caregiver education & training;Equipment evaluation, education, & procurement; Therapeutic activities     Restrictions  Restrictions/Precautions  Restrictions/Precautions: General Precautions, Fall Risk, Weight Bearing  Required Braces or Orthoses?: No  Lower Extremity Weight Bearing Restrictions  Left Lower Extremity Weight Bearing: Weight Bearing As Tolerated  Position Activity Restriction  Other position/activity restrictions: Up w/ assist, RACHELLE hose, RUE IV     Subjective    Subjective  Subjective: Patient agreeable for PT treatment. Patient sitting at edge of chair up OT staff taking BP.   Orientation  Overall Orientation Status: Within Functional Limits  Cognition  Overall Cognitive Status: WFL  Safety Judgement: Decreased awareness of need for assistance;Decreased awareness of need for safety     Objective   Bed Mobility Training  Bed Mobility Training:  (Patient up in chair upon writer's arrival and exit.)  Balance  Sitting: Intact  Standing: High guard (W/ RW and Min x 2 A)  Transfer Training  Transfer Training: Yes  Overall Level of Assistance: Contact-guard assistance  Interventions: Safety awareness training; Tactile cues; Verbal cues  Sit to Stand: Contact-guard assistance  Stand to Sit: Contact-guard assistance  Bed to Chair: Minimum assistance;Assist X2   Toilet Transfer: Minimum assistance;Assist X2  Comment: Patient required increased time effort and VCs for technique. Gait Training  Gait Training: Yes  Gait  Overall Level of Assistance: Minimum assistance;Assist X2  Interventions: Safety awareness training;Verbal cues; Tactile cues  Speed/Corina: Shuffled  Gait Abnormalities: Shuffling gait  Distance (ft):  (5' x 1, 10' x 1, 15' x 1)  VCs for technique, pursed lip breathing and emotional support. Patient appears symptomatic and report needs to sit down after 5 steps toward the bathroom, able to bring chair so patient to sit (BP take and okay /53, Map 65, Pulse 78.). Patient rested and able then to ambulated to the toilet. Felt \"okay\" but deep breathing throughout and blinks several time like she is trying to reorient herself. Min A x2 provided throughout secondary to dizziness/lightheadedness to ensure patient safety. Assistive Device: Walker, rolling; gait belt  Neuromuscular Education  Neuromuscular Education: Yes  NDT Treatment: Gait ;Sitting;Standing  Neuromuscular Comments: VCs req for proper breathing beto (pursed lip breathing) during functional mobility. Tactile and VCs req for postural control during sit<>stands & amb to promote abdominal and erector spinae mm facilitation for increased stability and balance, decreasing kyphosis of the spine. Pt req VCs to correct for forward WS with squatting in addition to pressing firmly into ground with feet, to promote the appropriate body mechanics for sit<>stand transfers.    PT Exercises  Exercise Treatment: ankle pumps x 15, quad sets x 10, glute sets x 10, heelslides

## 2022-08-06 VITALS
SYSTOLIC BLOOD PRESSURE: 151 MMHG | WEIGHT: 149.03 LBS | HEIGHT: 64 IN | RESPIRATION RATE: 16 BRPM | TEMPERATURE: 97.3 F | OXYGEN SATURATION: 100 % | HEART RATE: 81 BPM | BODY MASS INDEX: 25.44 KG/M2 | DIASTOLIC BLOOD PRESSURE: 61 MMHG

## 2022-08-06 LAB
HCT VFR BLD CALC: 24.3 % (ref 36.3–47.1)
HEMOGLOBIN: 7.8 G/DL (ref 11.9–15.1)

## 2022-08-06 PROCEDURE — 36415 COLL VENOUS BLD VENIPUNCTURE: CPT

## 2022-08-06 PROCEDURE — 2580000003 HC RX 258: Performed by: ORTHOPAEDIC SURGERY

## 2022-08-06 PROCEDURE — 85018 HEMOGLOBIN: CPT

## 2022-08-06 PROCEDURE — 99232 SBSQ HOSP IP/OBS MODERATE 35: CPT | Performed by: INTERNAL MEDICINE

## 2022-08-06 PROCEDURE — 97116 GAIT TRAINING THERAPY: CPT

## 2022-08-06 PROCEDURE — 6370000000 HC RX 637 (ALT 250 FOR IP): Performed by: ORTHOPAEDIC SURGERY

## 2022-08-06 PROCEDURE — 6360000002 HC RX W HCPCS: Performed by: ORTHOPAEDIC SURGERY

## 2022-08-06 PROCEDURE — 97530 THERAPEUTIC ACTIVITIES: CPT

## 2022-08-06 PROCEDURE — 97535 SELF CARE MNGMENT TRAINING: CPT

## 2022-08-06 PROCEDURE — 85014 HEMATOCRIT: CPT

## 2022-08-06 RX ADMIN — ACETAMINOPHEN 650 MG: 325 TABLET, FILM COATED ORAL at 04:52

## 2022-08-06 RX ADMIN — ASPIRIN 325 MG: 325 TABLET, DELAYED RELEASE ORAL at 08:06

## 2022-08-06 RX ADMIN — KETOROLAC TROMETHAMINE 30 MG: 30 INJECTION, SOLUTION INTRAMUSCULAR at 11:25

## 2022-08-06 RX ADMIN — BISACODYL 5 MG: 5 TABLET, COATED ORAL at 08:06

## 2022-08-06 RX ADMIN — ACETAMINOPHEN 650 MG: 325 TABLET, FILM COATED ORAL at 11:24

## 2022-08-06 RX ADMIN — SODIUM CHLORIDE: 9 INJECTION, SOLUTION INTRAVENOUS at 07:49

## 2022-08-06 RX ADMIN — KETOROLAC TROMETHAMINE 30 MG: 30 INJECTION, SOLUTION INTRAMUSCULAR at 04:53

## 2022-08-06 ASSESSMENT — PAIN DESCRIPTION - LOCATION
LOCATION: HIP

## 2022-08-06 ASSESSMENT — PAIN DESCRIPTION - FREQUENCY: FREQUENCY: CONTINUOUS

## 2022-08-06 ASSESSMENT — PAIN SCALES - GENERAL
PAINLEVEL_OUTOF10: 2
PAINLEVEL_OUTOF10: 2
PAINLEVEL_OUTOF10: 4
PAINLEVEL_OUTOF10: 5

## 2022-08-06 ASSESSMENT — PAIN DESCRIPTION - DESCRIPTORS
DESCRIPTORS: ACHING;DISCOMFORT
DESCRIPTORS: ACHING;DISCOMFORT

## 2022-08-06 ASSESSMENT — PAIN DESCRIPTION - ONSET: ONSET: ON-GOING

## 2022-08-06 ASSESSMENT — PAIN DESCRIPTION - ORIENTATION
ORIENTATION: LEFT

## 2022-08-06 ASSESSMENT — PAIN - FUNCTIONAL ASSESSMENT: PAIN_FUNCTIONAL_ASSESSMENT: PREVENTS OR INTERFERES SOME ACTIVE ACTIVITIES AND ADLS

## 2022-08-06 ASSESSMENT — PAIN DESCRIPTION - PAIN TYPE: TYPE: SURGICAL PAIN

## 2022-08-06 NOTE — PROGRESS NOTES
Pt discharged to home in stable condition with belongings  Discharge instructions given  Medications were filled and are at home  Pt denies having any further questions at this time  Personal items given to patient at discharge  Patient/family state they have everything they were admitted with.   Hibiclens sent home with patient

## 2022-08-06 NOTE — PROGRESS NOTES
to Sit: Stand-by assistance  Gait Training  Gait Training:  Yes  Gait  Overall Level of Assistance: Stand-by assistance  Interventions: (Safety awareness training;Verbal cues; Tactile cues  Speed/Corina: (Slow  Gait Abnormalities: Good swing through and floor clearance. Decreased WB throguh UE's for normalized gait pattern. Distance (ft): 200 Feet  Assistive Device: Walker, rolling  Rail Use: Both  Stairs - Level of Assistance: Stand-by assistance. Demo's good sequencing. Number of Stairs Trained: 15     PT Exercises  Exercise Treatment: Completed AP's x15, x10 LAQ for review of HEP. Pt with good demo. Goals  Short Term Goals  Time Frame for Short term goals: 6 visits  Short term goal 1: Pt to demonstrate bed mobility Gage while maintaining LLE anterior hip precautions  Short term goal 2: Pt to perform STS transfers w/ RW Gage  Short term goal 3: Pt to be indep w/ RAQUEL HEP  Short term goal 4: Pt to ambulate at least 50ft w/ RW Gage  Short term goal 5: Pt to ascend/descend 10 stairs w/ handrails SBA  Patient Goals   Patient goals : To go home    Education  Patient Education  Education Given To: Patient  Education Provided: Role of Therapy;Plan of Care;Precautions; Home Exercise Program;Energy Conservation;Transfer Training; Fall Prevention Strategies  Education Outcome: Verbalized understanding    Therapy Time   Individual Concurrent Group Co-treatment   Time In 0940         Time Out 1003         Minutes [address] [place], [place]

## 2022-08-06 NOTE — PROGRESS NOTES
Occupational Therapy  Facility/Department: [facility] MED SURG  Rehabilitation Occupational Therapy Daily Treatment Note    Date: 22  Patient Name: Luke Jay       Room:   MRN: [id number]  Account: [de-identified]   : 1960  (63 y.o.) Gender: female            Past Medical History:  has a past medical history of Arthritis. Past Surgical History:   has a past surgical history that includes Breast surgery (Right); Colonoscopy; Endometrial ablation; and Total hip arthroplasty (Left, 2022). Restrictions  Restrictions/Precautions: General Precautions; Fall Risk;Weight Bearing  Other position/activity restrictions: Up w/ assist, RACHELLE hose, RUE IV  Left Lower Extremity Weight Bearing: Weight Bearing As Tolerated  Required Braces or Orthoses?: No    Subjective  Subjective: \"I feel much better! \"  Restrictions/Precautions: General Precautions; Fall Risk;Weight Bearing     Objective     Cognition  Overall Cognitive Status: WFL  Orientation  Overall Orientation Status: Within Functional Limits         ADL  Grooming/Oral Hygiene  Assistance Level: Set-up; Stand by assist  Upper Extremity Bathing  Assistance Level: Set-up; Stand by assist  Skilled Clinical Factors: seated  Lower Extremity Bathing  Assistance Level: Set-up; Minimal assistance  Skilled Clinical Factors: Min A to wash L foot  Upper Extremity Dressing  Assistance Level: Set-up; Stand by assist  Putting On/Taking Off Footwear  Assistance Level: Set-up; Moderate assistance  Skilled Clinical Factors: Mod A to don/doff L sock - Total assist to don compression socks  Toileting  Assistance Level: Set-up; Stand by assist  Toilet Transfers  Additional Factors: Set-up  Assistance Level: Stand by assist;Verbal cues  Skilled Clinical Factors: Verbal cues for use of grab bar, pt with good follow through  Tub/Shower Transfers  Type: Shower  Transfer From: Lorren Barer  Transfer To:  Shower chair without back  Additional Factors: Cues for hand placement;Set-up  Assistance Level: Contact guard assist  Skilled Clinical Factors: Verbal cues for use of grab bars as needed, pt with good follow through          Functional Mobility  Device: Rolling walker  Activity: To/From bathroom  Assistance Level: Contact guard assist  Skilled Clinical Factors: Patient completed functional mobility to/from the bathroom with CGA and use of RW. Patient demonstrated small steps, but good sequencing and safety with mobility. Verbal cues provided for overall safety and environment scanning to improve safety awareness and independence with ADL completion  Sit to Stand  Assistance Level: Stand by assist  Skilled Clinical Factors: VC for use of grab bars as able  Stand to Sit  Assistance Level: Stand by assist         Assessment  Assessment  Activity Tolerance: Patient tolerated treatment well  Discharge Recommendations: Patient would benefit from continued therapy after discharge  OT Equipment Recommendations  Equipment Needed: Yes  Mobility Devices: ADL Assistive Devices  ADL Assistive Devices: Long-handled Shoe Horn;Reacher;Sock-Aid Hard;Long-handled Sponge  Safety Devices  Safety Devices in place: Yes  Type of devices: All fall risk precautions in place; Left in chair;Call light within reach;Nurse notified; Chair alarm in place; Patient at risk for falls    Patient Education  Education  Education Given To: Patient  Education Provided: Role of Therapy; Mobility Training; Fall Prevention Strategies; Plan of Care;Transfer Training;Energy Conservation;Precautions; Safety;ADL Function;Equipment  Education Provided Comments: AD/DME education, LBD education, compression sock wear schedule, increasing activity as tolerated  Education Method: Verbal;Demonstration  Barriers to Learning: None  Education Outcome: Continued education needed;Verbalized understanding  Access Code: [email or web address]  URL: [email or web address]  Date: 08/06/2022  Prepared by: Hayder Schwarz    Patient Education  Understanding Energy Conservation  Energy Conservation During Daily Tasks  Bathing & Showering Tips  Adaptive Equipment for [address]  [facility] [address] [facility] [address] for [employer] with a [address]  Times per Week: 5-6x/wk 1x/day as tirso  Times per Day: Daily  Current Treatment Recommendations: Strengthening;Balance training;Functional mobility training; Endurance training; Safety education & training;Equipment evaluation, education, & procurement;Self-Care / ADL; Home management training    Goals  Patient Goals   Patient goals : To go home! Short Term Goals  Time Frame for Short term goals: By discharge, pt to demo  Short Term Goal 1: functional mobility with SBA and use of AD as needed. Short Term Goal 2: bed mobility to SBA with use of bedrails as needed while maintaing RAQUEL precuations. Short Term Goal 3: UB ADLs to SBA and LB ADLs to Min A with use of AD/AE as needed. Short Term Goal 4: toileting to SBA with use of AD/grab bars as needed. Short Term Goal 5: ADL transfers to SBA with use of AD as needed. Long Term Goals  Long Term Goal 1: I with fall prevention education, EC/WS tech, recommendations for AE, RAQUEL precuations, safe car transfers and RACHELLE hose wear/purpose with use of handouts as needed. AM-PAC Score        AM-[facility] Inpatient Daily Activity Raw Score: 19 (08/06/22 0852)  AM-PAC Inpatient ADL T-Scale Score : 40.22 (08/06/22 0852)  ADL Inpatient CMS 0-100% Score: 42.8 (08/06/22 0852)  ADL Inpatient CMS G-Code Modifier : CK (08/06/22 3108)      Therapy Time   Individual Concurrent Group Co-treatment   Time In 0832         Time Out 0921         Minutes 49 + 10 minutes for printed education handout  Total time= 59          Upon writer exit, call light within reach, pt retired to chair. All lines intact and patient positioned comfortably. All patient needs addressed prior to ending therapy session. Chart reviewed prior to treatment and patient is agreeable for therapy.   RN reports patient is medically stable for therapy treatment this date.      POOJA Trent/L

## 2022-08-06 NOTE — PROGRESS NOTES
[facility]  Office: [address], DO, Leigh Apple, DO, Jose Armando Petit, DO, Carolynn Kent Bayron, DO, Jessica Nicole MD, Mookie Theodore MD, Radha Cyr MD, Guillermo Wing MD,  Juan F Brasher MD, Wilian Garcia MD, Angella Bal DO, Stevo Ward MD,  Carrie Moody MD, Wei Butler MD, Cecilia Avelar, DO, Rui Rubin MD, Chey Jackson MD, Adalgisa Hinton MD, Mitra Young, DO, Julissa Velazquez MD, Manjula Smith MD, Rufina Jasso, CNP,  Fatemeh Tipton, CNP, Jasmyne Gamboa, CNP, Keven Gabriel, CNP, Aram Epps PA-C, August Knight, [facility], Jam Keene, CNP, Naya Ojeda, CNP, Rafa Patton, CNP, Calvin Mccrary, CNP, Colt Hammonds, CNP, Alvarez Mckeon, CNS, Ludin Villegas, [facility], Prince Andrews, CNP, Anjelica Rachel, CNP, Chanel Eisenberg, [facility]    Progress Note    8/6/2022    3:43 PM    Name:   Carter Kothari  MRN:     [id number]     [place]:      [de-identified]   Room:   2110/2110-01   Day:  0  Admit Date:  8/4/2022  7:58 AM    PCP:   Bharat Dixon MD  Code Status:  Prior  Reason for Consult: Postop medical management, hypotension/dizziness  Subjective:     C/C: Left hip pain  Interval History Status: . Late entry  Patient was seen today morning around 10 AM s/p left hip arthroplasty on 8/4/2022  Postoperatively patient doing well, denies pain  Vitals were stable  Brief History:   Patient presents to [facility] on 8/4/2022 for a scheduled left total hip arthroplasty with Dr. Pedro Galindo. The patient's left hip pain has progressively worsened over the past 1 year. Patient still exercised but has increased pain at times with exercise. Left hip pain was aggravated by laying down, walking long distances, getting in and out of the car and is minimally relieved with Tylenol. Prior treatment includes cortisone injections and physical therapy.    Per report, patient had an episode of fainting yesterday in the evening while working with physical therapy. Today the patient complained of dizziness and lightheadedness again while working with therapy. Patient reports that her dizziness has improved throughout the day today. She thinks that the pain medication is making her lightheadedness worse, as she felt worse 20 minutes after taking her Oxycodone. Review of Systems:     Constitutional:  negative for chills, fevers, sweats  Respiratory:  negative for cough, dyspnea on exertion, shortness of breath, wheezing  Cardiovascular:  negative for chest pain, chest pressure/discomfort, lower extremity edema, palpitations  Gastrointestinal:  negative for abdominal pain, constipation, diarrhea, nausea, vomiting  Neurological:  negative for dizziness, headache    Medications: Allergies: Allergies   Allergen Reactions    Sulfa Antibiotics Rash       Current Meds:   Scheduled Meds:   Continuous Infusions:   PRN Meds:     Data:     Past Medical History:   has a past medical history of Arthritis. Social History:   reports that she quit smoking about 27 years ago. Her smoking use included cigarettes. She started smoking about 42 years ago. She has never used smokeless tobacco. She reports current alcohol use. She reports that she does not use drugs. Family History: History reviewed. No pertinent family history. Vitals:  BP (!) 151/61   Pulse 81   Temp 97.3 °F (36.3 °C) (Oral)   Resp 16   Ht 5' 4\" (1.626 m)   Wt 149 lb 0.5 oz (67.6 kg)   SpO2 100%   BMI 25.58 kg/m²   Temp (24hrs), Av.3 °F (36.8 °C), Min:97.3 °F (36.3 °C), Max:99.1 °F (37.3 °C)    No results for input(s): POCGLU in the last 72 hours. I/O (24Hr):     Intake/Output Summary (Last 24 hours) at 2022 1543  Last data filed at 2022 1258  Gross per 24 hour   Intake 4441.19 ml   Output 2400 ml   Net 2041.19 ml       Labs:  Hematology:  Recent Labs     22  1105 22  0620   WBC 10.3  --    RBC 3.06*  -- HGB 8.6* 7.8*   HCT 27.3* 24.3*   MCV 89.2  --    MCH 28.1  --    MCHC 31.5  --    RDW 13.8  --      --    MPV 8.8  --      Chemistry:No results for input(s): NA, K, CL, CO2, GLUCOSE, BUN, CREATININE, MG, ANIONGAP, LABGLOM, GFRAA, CALCIUM, CAION, PHOS, PSA, PROBNP, TROPHS, CKTOTAL, CKMB, CKMBINDEX, MYOGLOBIN, DIGOXIN, LACTACIDWB in the last 72 hours. No results for input(s): PROT, LABALBU, LABA1C, R4QOSZP, V9QNAVM, FT4, TSH, AST, ALT, LDH, GGT, ALKPHOS, LABGGT, BILITOT, BILIDIR, AMMONIA, AMYLASE, LIPASE, LACTATE, CHOL, HDL, LDLCHOLESTEROL, CHOLHDLRATIO, TRIG, VLDL, WHM78AW, PHENYTOIN, PHENYF, URICACID, POCGLU in the last 72 hours. ABG:No results found for: POCPH, PHART, PH, POCPCO2, TGV5QIL, PCO2, POCPO2, PO2ART, PO2, POCHCO3, JXJ1RMY, HCO3, NBEA, PBEA, BEART, BE, THGBART, THB, ZKG1BDQ, STED2RUV, C1EMQNPS, O2SAT, FIO2  No results found for: SPECIAL  No results found for: CULTURE    Radiology:  XR PELVIS (1-2 VIEWS)    Result Date: 8/4/2022  Postsurgical changes of left total hip arthroplasty without specific imaging features of immediate postop complication seen. XR HIP LEFT (2-3 VIEWS)    Result Date: 8/4/2022  Postsurgical changes of left total hip arthroplasty without specific imaging features of immediate postop complication seen.        Physical Examination:        General appearance:  alert, cooperative and no distress, sitting comfortably in chair  Mental Status:  oriented to person, place and time and normal affect  Lungs: Air entry slightly diminished, no rales no wheezing  Heart:  regular rate and rhythm, no murmur  Abdomen:  soft, nontender, nondistended, normal bowel sounds, no masses, hepatomegaly, splenomegaly  Extremities:  no edema, redness, tenderness in the calves  Skin:  no gross lesions, rashes, induration    Assessment:        Hospital Problems             Last Modified POA    * (Principal) Primary localized osteoarthritis of left hip 8/5/2022 Yes    S/P total left hip arthroplasty 8/5/2022 Yes    Lightheadedness 8/5/2022 Yes       Plan:        Encouraged patient for incentive spirometry  Continue same medication regimen  Okay to be discharged from medical standpoint, will sign off please call as needed     Thank you    Angélica Sevilla MD  8/6/2022  3:43 PM

## 2022-08-06 NOTE — PLAN OF CARE
Problem: Discharge Planning  Goal: Discharge to home or other facility with appropriate resources  8/6/2022 1259 by Mark Anthony Rosales RN  Outcome: Completed  Flowsheets (Taken 8/6/2022 9817)  Discharge to home or other facility with appropriate resources:   Identify barriers to discharge with patient and caregiver   Refer to discharge planning if patient needs post-hospital services based on physician order or complex needs related to functional status, cognitive ability or social support system  8/6/2022 0223 by Linus Eduardo RN  Outcome: Progressing     Problem: Pain  Goal: Verbalizes/displays adequate comfort level or baseline comfort level  8/6/2022 1259 by Mark Anthony Rosales RN  Outcome: Completed  8/6/2022 0223 by Linus Eduardo RN  Outcome: Progressing     Problem: ABCDS Injury Assessment  Goal: Absence of physical injury  8/6/2022 1259 by Mark Anthony Rosales RN  Outcome: Completed  8/6/2022 0223 by Linus Eduardo RN  Outcome: Progressing  Flowsheets (Taken 8/5/2022 2015)  Absence of Physical Injury: Implement safety measures based on patient assessment     Problem: Safety - Adult  Goal: Free from fall injury  8/6/2022 1259 by Mark Anthony Rosales RN  Outcome: Completed  8/6/2022 0223 by Linus Eduardo RN  Outcome: Progressing  Flowsheets (Taken 8/5/2022 2015)  Free From Fall Injury:   Instruct family/caregiver on patient safety   Based on caregiver fall risk screen, instruct family/caregiver to ask for assistance with transferring infant if caregiver noted to have fall risk factors     Problem: Musculoskeletal - Adult  Goal: Return mobility to safest level of function  8/6/2022 1259 by Mark Anthony Rosales RN  Outcome: Completed  Flowsheets (Taken 8/6/2022 0806)  Return Mobility to Safest Level of Function: Assess patient stability and activity tolerance for standing, transferring and ambulating with or without assistive devices  8/6/2022 0223 by Linus Eduardo RN  Outcome: Progressing  Goal: Maintain proper alignment of affected body part  8/6/2022 1259 by Shellie Huizar RN  Outcome: Completed  Flowsheets (Taken 8/6/2022 4916)  Maintain proper alignment of affected body part: Support and protect limb and body alignment per provider's orders  8/6/2022 0223 by Jeanne Mora RN  Outcome: Progressing  Goal: Return ADL status to a safe level of function  8/6/2022 1259 by Shellie Huizar RN  Outcome: Completed  Flowsheets (Taken 8/6/2022 0806)  Return ADL Status to a Safe Level of Function: Assist and instruct patient to increase activity and self care as tolerated  8/6/2022 0223 by Jeanne Mora RN  Outcome: Progressing     Problem: Neurosensory - Adult  Goal: Achieves stable or improved neurological status  Outcome: Completed  Flowsheets (Taken 8/6/2022 0806)  Achieves stable or improved neurological status: Assess for and report changes in neurological status     Problem: Skin/Tissue Integrity - Adult  Goal: Skin integrity remains intact  Recent Flowsheet Documentation  Taken 8/6/2022 0806 by Shellie Huizar RN  Skin Integrity Remains Intact: Monitor for areas of redness and/or skin breakdown     Problem: Hematologic - Adult  Goal: Maintains hematologic stability  Outcome: Completed  Flowsheets (Taken 8/6/2022 0806)  Maintains hematologic stability: Assess for signs and symptoms of bleeding or hemorrhage

## 2022-08-06 NOTE — PLAN OF CARE
Problem: Discharge Planning  Goal: Discharge to home or other facility with appropriate resources  8/6/2022 0223 by Shaun Patel RN  Outcome: Progressing  8/5/2022 1718 by Michael Boyle RN  Outcome: Progressing     Problem: Pain  Goal: Verbalizes/displays adequate comfort level or baseline comfort level  8/6/2022 0223 by Shaun Patel RN  Outcome: Progressing  8/5/2022 1718 by Michael Boyle RN  Outcome: Progressing     Problem: ABCDS Injury Assessment  Goal: Absence of physical injury  8/6/2022 0223 by Shaun Patel RN  Outcome: Progressing  8/5/2022 1718 by Michael Boyle RN  Outcome: Progressing  Flowsheets (Taken 8/5/2022 1010)  Absence of Physical Injury: Implement safety measures based on patient assessment     Problem: Safety - Adult  Goal: Free from fall injury  8/6/2022 0223 by Shaun Patel RN  Outcome: Progressing  8/5/2022 1718 by Michael Boyle RN  Outcome: Progressing  Flowsheets (Taken 8/5/2022 1010)  Free From Fall Injury: Instruct family/caregiver on patient safety     Problem: Musculoskeletal - Adult  Goal: Return mobility to safest level of function  8/6/2022 0223 by Shaun Patel RN  Outcome: Progressing  8/5/2022 1718 by Michael Boyle RN  Outcome: Progressing  Goal: Maintain proper alignment of affected body part  8/6/2022 0223 by Shaun Patel RN  Outcome: Progressing  8/5/2022 1718 by Michael Boyle RN  Outcome: Progressing  Goal: Return ADL status to a safe level of function  8/6/2022 0223 by Shaun Patel RN  Outcome: Progressing  8/5/2022 1718 by Michael Boyle RN  Outcome: Progressing

## (undated) DEVICE — 3M™ IOBAN™ 2 ANTIMICROBIAL INCISE DRAPE 6651EZ: Brand: IOBAN™ 2

## (undated) DEVICE — BLANKET WRM W29.9XL79.1IN UP BODY FORC AIR MISTRAL-AIR

## (undated) DEVICE — GLOVE SURG SZ 8 L12IN FNGR THK79MIL GRN LTX FREE

## (undated) DEVICE — PADDING CAST W6INXL4YD POLY POR SPUN DACRON SYN VERSATILE

## (undated) DEVICE — BNDG,ELSTC,MATRIX,STRL,6"X5YD,LF,HOOK&LP: Brand: MEDLINE

## (undated) DEVICE — SUTURE VCRL SZ 0 L27IN ABSRB UD L36MM CP-1 1/2 CIR REV CUT J267H

## (undated) DEVICE — STOCKINETTE,DOUBLE PLY,4X48,STERILE: Brand: MEDLINE

## (undated) DEVICE — DRAPE,REIN 53X77,STERILE: Brand: MEDLINE

## (undated) DEVICE — GOWN,AURORA,NONRNF,XL,30/CS: Brand: MEDLINE

## (undated) DEVICE — BANDAGE COBAN 6 IN WND 6INX5YD FOAM

## (undated) DEVICE — SUTURE STRATAFIX SYMMETRIC PDS + SZ 1 L18IN ABSRB VLT L48MM SXPP1A400

## (undated) DEVICE — ZIPPERED TOGA, 2X LARGE: Brand: FLYTE, SURGICOOL

## (undated) DEVICE — APPLICATOR MEDICATED 26 CC SOLUTION HI LT ORNG CHLORAPREP

## (undated) DEVICE — SYRINGE 20ML LL S/C 50

## (undated) DEVICE — GLOVE SURG SZ 75 CRM LTX FREE POLYISOPRENE POLYMER BEAD ANTI

## (undated) DEVICE — 2108 SERIES SAGITTAL BLADE, NO OFFSET  (18.6 X 1.24 X 105MM)

## (undated) DEVICE — BIT DRL L30MM DIA3.2MM DISP FOR G7 2 MOBILITY CONSTRUCT

## (undated) DEVICE — NEEDLE, QUINCKE, 18GX3.5": Brand: MEDLINE

## (undated) DEVICE — DRAPE,EXTREMITY,BILATERAL,ORTHOMAX: Brand: MEDLINE

## (undated) DEVICE — DRAPE,T,LIMB,BILATERAL,STERILE: Brand: MEDLINE

## (undated) DEVICE — COVER,MAYO STAND,XL,STERILE: Brand: MEDLINE

## (undated) DEVICE — 3M™ IOBAN™ 2 ANTIMICROBIAL INCISE DRAPE 6650EZ: Brand: IOBAN™ 2

## (undated) DEVICE — SUTURE MCRYL SZ 3-0 L27IN ABSRB UD PS-2 3/8 CIR REV CUT NDL MCP427H

## (undated) DEVICE — DRESSING HYDROFIBER AQUACEL AG ADVANTAGE 3.5X10 IN

## (undated) DEVICE — SUTURE 2-0 STRATAFIX MONOCRYL 45CM CT-1

## (undated) DEVICE — GAUZE,SPONGE,FLUFF,6"X6.75",STRL,5/TRAY: Brand: MEDLINE

## (undated) DEVICE — DRAPE,U/ SHT,SPLIT,PLAS,STERIL: Brand: MEDLINE

## (undated) DEVICE — ADHESIVE SKIN CLOSURE TOP 36 CC HI VISC DERMBND MINI

## (undated) DEVICE — SOLUTION IRRIG 3000ML 0.9% SOD CHL USP UROMATIC PLAS CONT

## (undated) DEVICE — C-ARM: Brand: UNBRANDED

## (undated) DEVICE — Device

## (undated) DEVICE — SHEET, ORTHO, SPLIT, STERILE: Brand: MEDLINE

## (undated) DEVICE — NEEDLE SPINAL 22GA L3.5IN SPINOCAN

## (undated) DEVICE — SYRINGE MED 30ML STD CLR PLAS LUERLOCK TIP N CTRL DISP